# Patient Record
Sex: FEMALE | Race: OTHER | Employment: UNEMPLOYED | URBAN - METROPOLITAN AREA
[De-identification: names, ages, dates, MRNs, and addresses within clinical notes are randomized per-mention and may not be internally consistent; named-entity substitution may affect disease eponyms.]

---

## 2017-03-31 ENCOUNTER — OFFICE VISIT (OUTPATIENT)
Dept: FAMILY MEDICINE CLINIC | Age: 52
End: 2017-03-31

## 2017-03-31 VITALS
HEART RATE: 69 BPM | BODY MASS INDEX: 25.45 KG/M2 | DIASTOLIC BLOOD PRESSURE: 59 MMHG | SYSTOLIC BLOOD PRESSURE: 116 MMHG | WEIGHT: 148 LBS | TEMPERATURE: 98.3 F

## 2017-03-31 DIAGNOSIS — J98.9 REACTIVE AIRWAY DISEASE THAT IS NOT ASTHMA: ICD-10-CM

## 2017-03-31 DIAGNOSIS — R00.2 INTERMITTENT PALPITATIONS: ICD-10-CM

## 2017-03-31 DIAGNOSIS — D25.9 UTERINE LEIOMYOMA, UNSPECIFIED LOCATION: ICD-10-CM

## 2017-03-31 DIAGNOSIS — F51.01 PRIMARY INSOMNIA: ICD-10-CM

## 2017-03-31 DIAGNOSIS — F32.A MILD DEPRESSION: Primary | ICD-10-CM

## 2017-03-31 RX ORDER — ALBUTEROL SULFATE 90 UG/1
2 AEROSOL, METERED RESPIRATORY (INHALATION)
Qty: 1 INHALER | Refills: 1 | Status: SHIPPED | OUTPATIENT
Start: 2017-03-31 | End: 2018-03-26

## 2017-03-31 NOTE — PROGRESS NOTES
Coordination of Care  1. Have you been to the ER, urgent care clinic since your last visit? Hospitalized since your last visit? No    2. Have you seen or consulted any other health care providers outside of the [facility] since your last visit? Include any pap smears or colon screening. No    Medications  Medication Reconciliation Performed: no  Patient does not need refills     Learning Assessment Complete?  yes

## 2017-03-31 NOTE — MR AVS SNAPSHOT
Visit Information Date & Time Provider Department Dept. Phone Encounter #  
 3/31/2017 10:30 AM Ted Fox [facility] [phone] [id number] Follow-up Instructions Return in about 3 months (around 6/30/2017), or if symptoms worsen or fail to improve. Upcoming Health Maintenance Date Due Hepatitis C Screening 1965 DTaP/Tdap/Td series (1 - Tdap) 9/2/1985 FOBT Q 1 YEAR AGE 50-75 9/2/2014 INFLUENZA AGE 9 TO ADULT 8/1/2016 PAP AKA CERVICAL CYTOLOGY 5/6/2018 BREAST CANCER SCRN MAMMOGRAM 5/19/2018 Allergies as of 3/31/2017  Review Complete On: 3/31/2017 By: Josh Nuñez No Known Allergies Current Immunizations  Never Reviewed No immunizations on file. Not reviewed this visit You Were Diagnosed With   
  
 Codes Comments Mild depression    -  Primary ICD-10-CM: F32.0 ICD-9-CM: 314 Primary insomnia     ICD-10-CM: F51.01 
ICD-9-CM: 307.42 Intermittent palpitations     ICD-10-CM: R00.2 ICD-9-CM: 785.1 Uterine leiomyoma, unspecified location     ICD-10-CM: D25.9 ICD-9-CM: 218.9 Vitals BP Pulse Temp Weight(growth percentile) LMP BMI  
 116/59 (BP 1 Location: Left arm, BP Patient Position: Sitting) 69 98.3 °F (36.8 °C) (Oral) 148 lb (67.1 kg) 02/24/2017 (Exact Date) 25.45 kg/m2 OB Status Smoking Status Having regular periods Never Smoker Vitals History BMI and BSA Data Body Mass Index Body Surface Area  
 25.45 kg/m 2 1.74 m 2 [facility] Name Phone Yvon Fuentes [address], [address] [phone] Your Updated Medication List  
  
   
This list is accurate as of: 3/31/17 11:19 AM.  Always use your most recent med list.  
  
  
  
  
 loratadine 10 mg tablet Commonly known as:  Libby Benitez Take 1 Tab by mouth daily. traZODone 50 mg tablet Commonly known as:  Tariq Mcfadden  
 Take 1 Tab by mouth nightly. TYLENOL 325 mg tablet Generic drug:  acetaminophen Take 325 mg by mouth every four (4) hours as needed. We Performed the Following AMB POC EKG ROUTINE W/ 12 LEADS, INTER & REP [03205 CPT(R)] Follow-up Instructions Return in about 3 months (around 6/30/2017), or if symptoms worsen or fail to improve. Por favor proporcione marty resumen de la documentación de cuidado a barakat próximo proveedor. Your primary care clinician is listed as Pinky Heads. If you have any questions after today's visit, please call [phone].

## 2017-06-07 ENCOUNTER — OFFICE VISIT (OUTPATIENT)
Dept: FAMILY PLANNING/WOMEN'S HEALTH CLINIC | Age: 52
End: 2017-06-07

## 2017-06-07 ENCOUNTER — HOSPITAL ENCOUNTER (OUTPATIENT)
Dept: MAMMOGRAPHY | Age: 52
Discharge: HOME | End: 2017-06-07

## 2017-06-07 VITALS — SYSTOLIC BLOOD PRESSURE: 122 MMHG | DIASTOLIC BLOOD PRESSURE: 75 MMHG

## 2017-06-07 DIAGNOSIS — Z01.419 ENCOUNTER FOR WELL WOMAN EXAM: Primary | ICD-10-CM

## 2017-06-07 DIAGNOSIS — Z12.31 VISIT FOR SCREENING MAMMOGRAM: ICD-10-CM

## 2017-06-07 PROCEDURE — 77067 SCR MAMMO BI INCL CAD: CPT

## 2017-06-07 NOTE — PROGRESS NOTES
EVERY WOMANS LIFE HISTORY QUESTIONNAIRE       No Yes Comments   Has a doctor ever seen or felt anything wrong with your breast? [x]                                  []                                     Have you ever had a breast biopsy? [x]                                  []                                          When and where was last mammogram performed? 5/2016 with [facility]    Have you ever been told that there was a problem on your mammogram?   No Yes Comments   []                                  [x]                                  Birads 0 x 1, 2016 then benign result     Do you have breast implants? No Yes Comments   [x]                                  []                                       When was your last Pap test performed? 5/2015    Have you ever had an abnormal Pap test?   No Yes Comments   [x]                                  []                                       Have you had a hysterectomy? No Yes Comments (why)   [x]                                  []                                       Have you ever been diagnosed with any type of Cancer   No Yes Comments (type,when,where,type of treatment   [x]                                  []                                          Has a family member been diagnosed with breast or ovarian cancer? No Yes Comments (which family members, and type   [x]                                  []                                       Did your mother take SABA? No Yes Unknown   []                                  []                                  X     Do you have a history of HIV exposure? No Yes    [x]                                  []                                         Have you been through menopause?    No Yes Date of LMP   [x]                                  []                                  Presently on period     Are you taking hormone replacement therapy (HRT)     No Yes Comments   [x]                                  [] How many times have you been pregnant? 2       Number of live births ? 2    Are you experiencing any of the following? No Yes Comments   Nipple Discharge [x]                                  []                                     Breast Lump/Masses [x]                                  []                                     Breast Skin Changes [x]                                  []                                          No Yes Comments   Vaginal Discharge [x]                                  []                                     Abnormal/unusual vaginal bleeding [x]                                  []                                         Are you experiencing any other health problems?     Seasonal allergies, has fibroids----followed at [facility]    On her period today, will defer gyn exam.  Not due for Pap Test this year

## 2017-06-07 NOTE — PROGRESS NOTES
Assessment/Plan:    Well woman exam  On menses so not able to do heme stool card- advised to f/up at [facility] for this test    Follow-up Disposition: Not on File    [address] LEENA Jurado expressed understanding of this plan. An AVS was printed and given to the patient.      ----------------------------------------------------------------------    Chief Complaint   Patient presents with    Well Woman     [facility] visit       History of Present Illness:   2 c-sections  Normal monthly periods in the past year. Last 5 days, not heavy bleeding. No hot flashes but some mood changes  No discomfort with sexual intercourse  Last mammo  normal  Last pap  normal  Hx of fibroid(s), no imaging in her chart but was on depo for the bleeding, currently not on hormone tx        No past medical history on file. Current Outpatient Prescriptions   Medication Sig Dispense Refill    loratadine (CLARITIN) 10 mg tablet Take 1 Tab by mouth daily. 30 Tab 6    traZODone (DESYREL) 50 mg tablet Take 1 Tab by mouth nightly. 30 Tab 2    albuterol (PROVENTIL HFA, VENTOLIN HFA, PROAIR HFA) 90 mcg/actuation inhaler Take 2 Puffs by inhalation every four (4) hours as needed for Wheezing or Shortness of Breath for up to 360 days. 1 Inhaler 1    acetaminophen (TYLENOL) 325 mg tablet Take 325 mg by mouth every four (4) hours as needed. No Known Allergies    Social History   Substance Use Topics    Smoking status: Never Smoker    Smokeless tobacco: None    Alcohol use No       No family history on file. Physical Exam:     Visit Vitals    /75       A&Ox3  WDWN NAD  Respirations normal and non labored  Breast exam- bri w/out masses, dimpling, skin retraction, nipple changes  abd- soft, flat  Pelvic exam bimanual only- on menstrual cycle.  Uterus is slightly out of pelvis, not tender to exam.

## 2017-10-16 ENCOUNTER — HOSPITAL ENCOUNTER (OUTPATIENT)
Dept: LAB | Age: 52
Discharge: HOME | End: 2017-10-16

## 2017-10-16 ENCOUNTER — OFFICE VISIT (OUTPATIENT)
Dept: FAMILY MEDICINE CLINIC | Age: 52
End: 2017-10-16

## 2017-10-16 VITALS
TEMPERATURE: 98.5 F | BODY MASS INDEX: 25.62 KG/M2 | SYSTOLIC BLOOD PRESSURE: 109 MMHG | HEART RATE: 68 BPM | WEIGHT: 153.8 LBS | HEIGHT: 65 IN | DIASTOLIC BLOOD PRESSURE: 67 MMHG

## 2017-10-16 DIAGNOSIS — Z12.11 ENCOUNTER FOR SCREENING FECAL OCCULT BLOOD TESTING: Primary | ICD-10-CM

## 2017-10-16 DIAGNOSIS — N93.8 DUB (DYSFUNCTIONAL UTERINE BLEEDING): ICD-10-CM

## 2017-10-16 LAB — HGB BLD-MCNC: 10.8 G/DL

## 2017-10-16 PROCEDURE — 88175 CYTOPATH C/V AUTO FLUID REDO: CPT | Performed by: PHYSICIAN ASSISTANT

## 2017-10-16 RX ORDER — LANOLIN ALCOHOL/MO/W.PET/CERES
325 CREAM (GRAM) TOPICAL
Qty: 90 TAB | Refills: 1 | Status: SHIPPED | OUTPATIENT
Start: 2017-10-16 | End: 2022-03-21

## 2017-10-16 NOTE — MR AVS SNAPSHOT
Visit Information Date & Time Provider Department Dept. Phone Encounter #  
 10/16/2017 10:30 AM Roberto Chacko [address], [address] [phone] [id number] Follow-up Instructions Return in about 6 weeks (around 11/27/2017). Upcoming Health Maintenance Date Due Hepatitis C Screening 1965 DTaP/Tdap/Td series (1 - Tdap) 9/2/1985 FOBT Q 1 YEAR AGE 50-75 9/2/2014 INFLUENZA AGE 9 TO ADULT 8/1/2017 PAP AKA CERVICAL CYTOLOGY 5/6/2018 BREAST CANCER SCRN MAMMOGRAM 6/7/2019 Allergies as of 10/16/2017  Review Complete On: 10/16/2017 By: Jared Oconnor No Known Allergies Current Immunizations  Never Reviewed No immunizations on file. Not reviewed this visit You Were Diagnosed With   
  
 Codes Comments Encounter for screening fecal occult blood testing    -  Primary ICD-10-CM: Z12.11 ICD-9-CM: V76.51   
 DUB (dysfunctional uterine bleeding)     ICD-10-CM: N93.8 ICD-9-CM: 626.8 Vitals BP Pulse Temp Height(growth percentile) Weight(growth percentile) 109/67 (BP 1 Location: Left arm, BP Patient Position: Sitting) 68 98.5 °F (36.9 °C) (Oral) 5' 4.76\" (1.645 m) 153 lb 12.8 oz (69.8 kg) LMP BMI OB Status Smoking Status 09/27/2017 (Exact Date) 25.78 kg/m2 Having regular periods Never Smoker BMI and BSA Data Body Mass Index Body Surface Area 25.78 kg/m 2 1.79 m 2 [facility] Name Phone Tangela Renee [address] [facility], [address] [facility] [phone] Your Updated Medication List  
  
   
This list is accurate as of: 10/16/17 10:57 AM.  Always use your most recent med list.  
  
  
  
  
 albuterol 90 mcg/actuation inhaler Commonly known as:  PROVENTIL HFA, VENTOLIN HFA, PROAIR HFA Take 2 Puffs by inhalation every four (4) hours as needed for Wheezing or Shortness of Breath for up to 360 days. ferrous sulfate 325 mg (65 mg iron) tablet Commonly known as:  Iron Take 1 Tab by mouth Daily (before breakfast). Take with orange juice  
  
 loratadine 10 mg tablet Commonly known as:  Roberto Santamariaff Take 1 Tab by mouth daily. traZODone 50 mg tablet Commonly known as:  [facility]worth Take 1 Tab by mouth nightly. TYLENOL 325 mg tablet Generic drug:  acetaminophen Take 325 mg by mouth every four (4) hours as needed. Recetas Enviado a la Raghav Refills  
 ferrous sulfate (IRON) 325 mg (65 mg iron) tablet 1 Sig: Take 1 Tab by mouth Daily (before breakfast). Take with orange juice Class: Normal  
 Pharmacy: Chidi Pappas [address], [address] [facility] #: [phone] Route: Oral  
  
We Performed the Following AMB POC FECAL BLOOD, OCCULT, QL 1 CARD [77322 CPT(R)] AMB POC HEMOGLOBIN (HGB) [90825 CPT(R)] Follow-up Instructions Return in about 6 weeks (around 11/27/2017). Patient Instructions Detección del cáncer de colon: Instrucciones de cuidado - [ Colon Cancer Screening: Care Instructions ] Instrucciones de cuidado El cáncer colorrectal se produce en el colon o en el recto. Esta es la parte final del aparato digestivo. Es la segunda causa principal de muertes por cáncer en los [place]. Suele comenzar con pequeñas formaciones llamadas pólipos en el colon o en el recto. Generalmente, los pólipos se encuentran con pruebas de detección. Dependiendo del tipo de prueba, se pueden extirpar los pólipos encontrados samson la prueba. Al principio, el cáncer colorrectal no suele producir síntomas. Sylwia las pruebas regulares pueden ayudar a detectarlo temprano, antes de que se extienda y sea más difícil de tratar. Los expertos [employer] personas empiecen a hacerse pruebas de rutina para detectar el cáncer de colon a partir de los 48 Los deb.  Y aconsejan que las personas con un riesgo más alto de tener cáncer de colon se ria las pruebas antes. Hable con barakat médico sobre cuándo debe empezar a [employer]. Hable acerca de qué pruebas necesita. La atención de seguimiento es bunny parte clave de barakat tratamiento y seguridad. Asegúrese de hacer y acudir a todas las citas, y llame a barakat médico si está teniendo problemas. También es bunny buena idea saber los resultados de angelina exámenes y mantener bunny lista de los medicamentos que ubaldo. Cuáles son [place] detección para el cáncer de colon? · Pruebas fecales. Estas pruebas incluyen la prueba inmunoquímica fecal (FIT, por angelina siglas en inglés) y la prueba de elise oculta en heces (FOBT, por angelina siglas en inglés). Estas pruebas [employer]  de heces para detectar señales de cáncer. Si el resultado de la prueba es positivo, tendrá que hacerse bunny colonoscopia. · Sigmoidoscopia. Esta prueba permite que el médico explore el revestimiento del recto y la parte más baja del colon. El médico usará un tubo iluminado llamado sigmoidoscopio. Esta prueba no puede detectar el cáncer ni los pólipos en la parte superior del colon. En algunos casos, pueden extirparse los pólipos que se encuentran. Sylwia si barakat médico detecta pólipos, usted tendrá que [employer] colonoscopia para examinar la parte superior del colon. · Colonoscopia. Esta prueba le permite al médico observar el revestimiento del recto y todo el colon. El médico utiliza un instrumento flexible y leach que se llama colonoscopio. También puede utilizarse para extirpar pólipos u obtener bunny muestra de tejido (biopsia). Qué pruebas necesita hacerse?  
Las siguientes pautas son para personas de 48 años o más que no corren un alto riesgo de tener cáncer colorrectal. Puede hacerse al menos bunny de estas pruebas según se lo indique barakat médico. 
· Bunny prueba inmunoquímica fecal (FIT, por angelina siglas en inglés) o Marlan Liner prueba de elise oculta en heces (FOBT, por micheline siglas en inglés) bunny vez al [employer] · Bunny sigmoidoscopia cada 5 años · Bunny colonoscopia cada 10 años Si tiene de 68 a 80 años, puede colaborar con craig médico para decidir si hacerse bunny prueba de detección es bunny buena opción. Si tiene [address] años o más, craig médico [place] byrnea que no es útil hacerse bunny prueba de detección. Hable con craig médico sobre cuándo [employer]. Y hable acerca de qué pruebas son adecuadas para usted. Craig médico puede recomendarle que se ellen pruebas antes o con más frecuencia si usted: 
· Ha tenido cáncer colorrectal con anterioridad. · Ha tenido pólipos en el colon. · Tiene síntomas de cáncer colorrectal. Estos incluyen elise en las heces y cambios en micheline hábitos de evacuación. · Tiene un padre o Deloria Slipper, un elier o Ebenweiler, o un hijo o bunny hija que tenga pólipos en el colon o cáncer colorrectal. 
· Tiene bunny enfermedad intestinal. [place] incluye colitis ulcerosa y enfermedad de Crohn. · Tiene un síndrome de pólipos, hereditario y poco común, cole la poliposis adenomatosa familiar (FAP, por micheline siglas en [facility]). · Se ha sometido a radioterapia en el abdomen o la pelvis. Cuándo debe pedir ayuda? Llame a craig médico ahora mismo o busque atención médica inmediata si: 
· Micheline heces son negruzcas y parecidas al alquitrán o tienen rastros de [place]. · Tiene dolor o sensibilidad en la parte baja del abdomen. Preste especial atención a los cambios en craig patt y asegúrese de comunicarse con craig médico si: · Tiene diarrea, estreñimiento u otro cambio en los hábitos intestinales que no mejora. · Micheline heces son más delgadas de lo normal. 
· Pierde peso y no sabe por qué. · Tiene cualquier pregunta acerca de las pruebas o el programa de detección. Dónde puede encontrar más información en inglés? Alison Imam a [email or web address]-yo.[email or web address]  
Veena Matthews [id number] en la búsqueda para aprender más acerca de \"Detección del cáncer de colon: Instrucciones de cuidado - [ Colon Cancer Screening: Care Instructions ]. \" 
Revisado: 3 lau, 2017 Versión del contenido: 11.3 © 2210-4989 Healthwise, Incorporated. Las instrucciones de cuidado fueron adaptadas bajo licencia por [employer] (which disclaims liability or warranty for this information). Si usted tiene [place] afección médica o sobre estas instrucciones, siempre pregunte a barakat profesional de patt. Healthwise, Incorporated niega toda garantía o responsabilidad por barakat uso de esta información. Por favor proporcione marty resumen de la documentación de cuidado a barakat próximo proveedor. Your primary care clinician is listed as Suzie Miller. If you have any questions after today's visit, please call [phone].

## 2017-10-16 NOTE — PROGRESS NOTES
Coordination of Care  1. Have you been to the ER, urgent care clinic since your last visit? Hospitalized since your last visit? No    2. Have you seen or consulted any other health care providers outside of the [address] [place] [address] since your last visit? Include any pap smears or colon screening. No    Medications  Does the patient need refills?  NO    Learning Assessment Complete? yes  Results for orders placed or performed in visit on 10/16/17   AMB POC HEMOGLOBIN (HGB)   Result Value Ref Range    Hemoglobin (POC) 10.8

## 2017-10-16 NOTE — PATIENT INSTRUCTIONS
Detección del cáncer de colon: Instrucciones de cuidado - [ Colon Cancer Screening: Care Instructions ]  Instrucciones de cuidado  El cáncer colorrectal se produce en el colon o en el recto. Esta es la parte final del aparato digestivo. Es la segunda causa principal de muertes por cáncer en los [place]. Suele comenzar con pequeñas formaciones llamadas pólipos en el colon o en el recto. Generalmente, los pólipos se encuentran con pruebas de detección. Dependiendo del tipo de prueba, se pueden extirpar los pólipos encontrados samson la prueba. Al principio, el cáncer colorrectal no suele producir síntomas. Sylwia las pruebas regulares pueden ayudar a detectarlo temprano, antes de que se extienda y sea más difícil de tratar. Los expertos [employer] personas empiecen a hacerse pruebas de rutina para detectar el cáncer de colon a partir de los 48 Los deb. Y aconsejan que las personas con un riesgo más alto de tener cáncer de colon se ria las pruebas antes. Hable con barakat médico sobre cuándo debe empezar a [employer]. Hable acerca de qué pruebas necesita. La atención de seguimiento es bunny parte clave de barakat tratamiento y seguridad. Asegúrese de hacer y acudir a todas las citas, y llame a barakat médico si está teniendo problemas. También es bunny buena idea saber los resultados de angelina exámenes y mantener bunny lista de los medicamentos que ubaldo. ¿Cuáles son [place] detección para el cáncer de colon? · Pruebas fecales. Estas pruebas incluyen la prueba inmunoquímica fecal (FIT, por angelina siglas en inglés) y la prueba de elise oculta en heces (FOBT, por angelina siglas en inglés). Estas pruebas [employer]  de heces para detectar señales de cáncer. Si el resultado de la prueba es positivo, tendrá que hacerse bunny colonoscopia. · Sigmoidoscopia. Esta prueba permite que el médico explore el revestimiento del recto y la parte más baja del colon.  El médico usará un tubo iluminado llamado sigmoidoscopio. Esta prueba no puede detectar el cáncer ni los pólipos en la parte superior del colon. En algunos casos, pueden extirparse los pólipos que se encuentran. Sylwia si barakat médico detecta pólipos, usted tendrá que [employer] colonoscopia para examinar la parte superior del colon. · Colonoscopia. Esta prueba le permite al médico observar el revestimiento del recto y todo el colon. El médico utiliza un instrumento flexible y leach que se llama colonoscopio. También puede utilizarse para extirpar pólipos u obtener bunny muestra de tejido (biopsia). ¿Qué pruebas necesita hacerse? Las siguientes pautas son para personas de 48 años o más que no corren un alto riesgo de tener cáncer colorrectal. Puede hacerse al menos bunny de estas pruebas según se lo indique barakat médico.  · Bunny prueba inmunoquímica fecal (FIT, por angelina siglas en inglés) o bunny prueba de elise oculta en heces (FOBT, por angelina siglas en inglés) bunny vez al Richardette Pinch  · [place] sigmoidoscopia cada 5 años  · [place] colonoscopia cada 10 años  Si tiene de 68 a 80 años, puede colaborar con barakat médico para decidir si hacerse bunny prueba de detección es bunny buena opción. Si tiene 80 años o más, barakat médico [employer] diga que no es útil hacerse bunny prueba de detección. Hable con barakat médico sobre cuándo [employer]. Y hable acerca de qué pruebas son adecuadas para usted. Barakat médico puede recomendarle que se ellen pruebas antes o con más frecuencia si usted:  · Ha tenido cáncer colorrectal con anterioridad. · Ha tenido pólipos en el colon. · Tiene síntomas de cáncer colorrectal. Estos incluyen elise en las heces y cambios en angelina hábitos de evacuación. · Tiene un padre o Charli Cantu, un elier o Ebenweiler, o un hijo o bunny hija que tenga pólipos en el colon o cáncer colorrectal.  · Tiene bunny enfermedad intestinal. [place] incluye colitis ulcerosa y enfermedad de Crohn.   · Tiene un síndrome de pólipos, hereditario y [address] común, cole la poliposis adenomatosa familiar (FAP, por micheline siglas en inglés). · Se ha sometido a radioterapia en el abdomen o la pelvis. ¿Cuándo debe pedir ayuda? Llame a barakat médico ahora mismo o busque atención médica inmediata si:  · Micheline heces son negruzcas y parecidas al alquitrán o tienen rastros de [place]. · Tiene dolor o sensibilidad en la parte baja del abdomen. Preste especial atención a los cambios en barakat patt y asegúrese de comunicarse con barakat médico si:  · Tiene diarrea, estreñimiento u otro cambio en los hábitos intestinales que no mejora. · Micheline heces son más delgadas de lo normal.  · Pierde peso y no sabe por qué. · Tiene cualquier pregunta acerca de las pruebas o el programa de detección. ¿Dónde puede encontrar más información en [facility]? Drea John a [email or web address]-yo.[email or web address] Ethel Hinojosa M541 en la búsqueda para aprender más acerca de \"Detección del cáncer de colon: Instrucciones de cuidado - [ Colon Cancer Screening: Care Instructions ]. \"  Revisado: 3 lau, 2017  Versión del contenido: 11.3  © 1276-4255 Healthwise, Incorporated. Las instrucciones de cuidado fueron adaptadas bajo licencia por [employer] (which disclaims liability or warranty for this information). Si usted tiene [place] afección médica o sobre estas instrucciones, siempre pregunte a barakat profesional de patt. Healthwise, Incorporated niega toda garantía o responsabilidad por barakat uso de esta información.

## 2017-10-16 NOTE — PROGRESS NOTES
Assessment/Plan:    Diagnoses and all orders for this visit:    1. Encounter for screening fecal occult blood testing  -     AMB POC FECAL BLOOD, OCCULT, QL 1 CARD    Offered to put pt back on OCP but she would like to stay off hormones  Start iron every day with OJ  Recheck in 6 weeks    Follow-up Disposition:  Return in about 1 year (around 10/16/2018). Olean Glow McFerren, PA-C Simon Moritz expressed understanding of this plan. An AVS was printed and given to the patient.      ----------------------------------------------------------------------    Chief Complaint   Patient presents with    Well Woman     Heavy irregula menses       History of Present Illness:  Pt here for continuation of her well woman exam from a few months ago. At that time, it was not possible to check a heme guaiac card due to her menstrual bleeding. She is not having any current menstrual bleeding. She has a hx of fibroids and often has irregular periods, she had been on hormone therapy to control her bleeding but chose to stop this. She is not bothered by her prolonged menstrual periods because she states that the periods are generally light- only spotting. She has not had a colonoscopy before. She has not family hx of colon cancer. She has not had a change in her bowel movements. She has never seen blood in her stool     Hemoglobin today is 10.8. This past month she had spotting for 17 days      No past medical history on file. Current Outpatient Prescriptions   Medication Sig Dispense Refill    albuterol (PROVENTIL HFA, VENTOLIN HFA, PROAIR HFA) 90 mcg/actuation inhaler Take 2 Puffs by inhalation every four (4) hours as needed for Wheezing or Shortness of Breath for up to 360 days. 1 Inhaler 1    loratadine (CLARITIN) 10 mg tablet Take 1 Tab by mouth daily. 30 Tab 6    traZODone (DESYREL) 50 mg tablet Take 1 Tab by mouth nightly.  30 Tab 2    acetaminophen (TYLENOL) 325 mg tablet Take 325 mg by mouth every four (4) hours as needed. No Known Allergies    Social History   Substance Use Topics    Smoking status: Never Smoker    Smokeless tobacco: Never Used    Alcohol use No       No family history on file.     Physical Exam:     Visit Vitals    /67 (BP 1 Location: Left arm, BP Patient Position: Sitting)    Pulse 68    Temp 98.5 °F (36.9 °C) (Oral)    Ht 5' 4.76\" (1.645 m)    Wt 153 lb 12.8 oz (69.8 kg)    LMP 09/27/2017 (Exact Date)    BMI 25.78 kg/m2       A&Ox3  WDWN NAD  Respirations normal and non labored  Rectal exam- no masses felt in vault, no external masses, scant feces present, heme neg guaiac today  Hemoglobin 10.8

## 2017-12-15 ENCOUNTER — OFFICE VISIT (OUTPATIENT)
Dept: FAMILY MEDICINE CLINIC | Age: 52
End: 2017-12-15

## 2017-12-15 VITALS
BODY MASS INDEX: 26.48 KG/M2 | DIASTOLIC BLOOD PRESSURE: 48 MMHG | HEART RATE: 64 BPM | SYSTOLIC BLOOD PRESSURE: 118 MMHG | TEMPERATURE: 98.3 F | WEIGHT: 158 LBS

## 2017-12-15 DIAGNOSIS — D50.0 IRON DEFICIENCY ANEMIA DUE TO CHRONIC BLOOD LOSS: ICD-10-CM

## 2017-12-15 DIAGNOSIS — F32.5 MAJOR DEPRESSIVE DISORDER WITH SINGLE EPISODE, IN REMISSION (HCC): ICD-10-CM

## 2017-12-15 DIAGNOSIS — I83.893 VARICOSE VEINS OF BOTH LEGS WITH EDEMA: ICD-10-CM

## 2017-12-15 DIAGNOSIS — N92.0 MENORRHAGIA WITH REGULAR CYCLE: Primary | ICD-10-CM

## 2017-12-15 LAB — HGB BLD-MCNC: 12.6 G/DL

## 2017-12-15 RX ORDER — DICLOFENAC SODIUM 10 MG/G
GEL TOPICAL 4 TIMES DAILY
Qty: 100 G | Refills: 0 | Status: SHIPPED | OUTPATIENT
Start: 2017-12-15

## 2017-12-15 NOTE — PROGRESS NOTES
Results for orders placed or performed in visit on 12/15/17   AMB POC HEMOGLOBIN (HGB)   Result Value Ref Range    Hemoglobin (POC) 12.6      Coordination of Care  1. Have you been to the ER, urgent care clinic since your last visit? Hospitalized since your last visit? No    2. Have you seen or consulted any other health care providers outside of the [address] [place] [address] since your last visit? Include any pap smears or colon screening. No    Medications  Does the patient need refills? NO    Learning Assessment Complete?  yes

## 2017-12-15 NOTE — MR AVS SNAPSHOT
Visit Information Date & Time Provider Department Dept. Phone Encounter #  
 12/15/2017 10:30 AM Kelley Lacey, [address] [facility] [address] [phone] [id number] Follow-up Instructions Return in about 6 months (around 6/15/2018), or if symptoms worsen or fail to improve. Upcoming Health Maintenance Date Due Hepatitis C Screening 1965 DTaP/Tdap/Td series (1 - Tdap) 9/2/1985 FOBT Q 1 YEAR AGE 50-75 9/2/2014 Influenza Age 5 to Adult 8/1/2017 PAP AKA CERVICAL CYTOLOGY 10/16/2020 Allergies as of 12/15/2017  Review Complete On: 12/15/2017 By: Kelley Lacey MD  
 No Known Allergies Current Immunizations  Never Reviewed No immunizations on file. Not reviewed this visit You Were Diagnosed With   
  
 Codes Comments Menorrhagia with regular cycle    -  Primary ICD-10-CM: N92.0 ICD-9-CM: 626.2 Iron deficiency anemia due to chronic blood loss     ICD-10-CM: D50.0 ICD-9-CM: 280.0 Major depressive disorder with single episode, in remission [facility])     ICD-10-CM: F32.5 ICD-9-CM: 296.25 Screening for iron deficiency anemia     ICD-10-CM: Z13.0 ICD-9-CM: V78.0 Varicose veins of both legs with edema     ICD-10-CM: L08.419 ICD-9-CM: 454.8 Vitals BP Pulse Temp Weight(growth percentile) LMP BMI  
 118/48 (BP 1 Location: Left arm, BP Patient Position: Sitting) 64 98.3 °F (36.8 °C) (Oral) 158 lb (71.7 kg) 11/24/2017 26.48 kg/m2 OB Status Smoking Status Having regular periods Never Smoker Vitals History BMI and BSA Data Body Mass Index Body Surface Area  
 26.48 kg/m 2 1.81 m 2 [facility] Name Phone Rafael Duty [address], [address] [phone] Your Updated Medication List  
  
   
This list is accurate as of: 12/15/17 11:09 AM.  Always use your most recent med list.  
  
  
  
  
 albuterol 90 mcg/actuation inhaler Commonly known as:  PROVENTIL HFA, VENTOLIN HFA, PROAIR HFA Take 2 Puffs by inhalation every four (4) hours as needed for Wheezing or Shortness of Breath for up to 360 days. diclofenac 1 % Gel Commonly known as:  VOLTAREN Apply  to affected area four (4) times daily. ferrous sulfate 325 mg (65 mg iron) tablet Commonly known as:  Iron Take 1 Tab by mouth Daily (before breakfast). Take with orange juice  
  
 loratadine 10 mg tablet Commonly known as:  Niki Dustman Take 1 Tab by mouth daily. traZODone 50 mg tablet Commonly known as:  Aurelio Belfast Take 1 Tab by mouth nightly. TYLENOL 325 mg tablet Generic drug:  acetaminophen Take 325 mg by mouth every four (4) hours as needed. Impresion de recetas Refills  
 diclofenac (VOLTAREN) 1 % gel 0 Sig: Apply  to affected area four (4) times daily. Class: Print Route: Topical  
  
We Performed the Following AMB POC HEMOGLOBIN (HGB) [93789 CPT(R)] Follow-up Instructions Return in about 6 months (around 6/15/2018), or if symptoms worsen or fail to improve. Por favor proporcione marty resumen de la documentación de cuidado a barakat próximo proveedor. Your primary care clinician is listed as Bev Duffy. If you have any questions after today's visit, please call [phone].

## 2017-12-15 NOTE — PROGRESS NOTES
Subjective:     Chief Complaint   Patient presents with    Anemia     follow up     Abdominal Pain      She  is a 47 y.o. female who presents for evaluation of:   Depression - Doing excellent today. Using Trazodone PRN for depression and to help with sleep. Depressed about relationship with . Had decr appetite and lost 30 lbs over about 3-4 months. Sx started after losing father and then mother in past few years. Sleep improved. Being tx for Fe def anemia with Fe and doing well. Occ has constipation type pain but noticed this resolves when she does not take her Fe. Drinking plenty of water, exercise, and fiber. ROS  Gen - no fever/chills  Resp - no dyspnea or cough  CV - no chest pain or ISSA  Rest per HPI     Objective:     Vitals:    12/15/17 1030   BP: 118/48   Pulse: 64   Temp: 98.3 °F (36.8 °C)   TempSrc: Oral   Weight: 158 lb (71.7 kg)     Physical Examination:  General appearance - alert, well appearing, and in no distress  Eyes - sclera anicteric  Chest - clear to auscultation, no wheezes, rales or rhonchi, symmetric air entry  Heart - normal rate, regular rhythm, normal S1, S2, no murmurs, rubs, clicks or gallops  Extr - no edema, + varicose veins noted and mildly ttp heraclio in RLE  Psych - nml mood and affect    No past medical history on file. Past Surgical History:   Procedure Laterality Date    HX GYN           Current Outpatient Prescriptions on File Prior to Visit   Medication Sig Dispense Refill    ferrous sulfate (IRON) 325 mg (65 mg iron) tablet Take 1 Tab by mouth Daily (before breakfast). Take with orange juice 90 Tab 1    albuterol (PROVENTIL HFA, VENTOLIN HFA, PROAIR HFA) 90 mcg/actuation inhaler Take 2 Puffs by inhalation every four (4) hours as needed for Wheezing or Shortness of Breath for up to 360 days. 1 Inhaler 1    loratadine (CLARITIN) 10 mg tablet Take 1 Tab by mouth daily. 30 Tab 6    traZODone (DESYREL) 50 mg tablet Take 1 Tab by mouth nightly.  27 Tab 2    acetaminophen (TYLENOL) 325 mg tablet Take 325 mg by mouth every four (4) hours as needed. No current facility-administered medications on file prior to visit. Assessment/ Plan:   Diagnoses and all orders for this visit:    1. Menorrhagia with regular cycle - hx of fibroids likely culprit  2. Iron deficiency anemia due to chronic blood loss - Hgb nml now and no concerns with palpitations of dizziness any longer. Was having some abd pain/constipation issues with Fe and advised of nml Hgb so discussed stopping Fe for 5-7 days if SE flare up and then restarting to maintain nml Hgb. 3. Major depressive disorder with single episode, in remission ([facility] [address]  - Improving with Trazdone PRN at low dose. 4. Varicose veins of both legs with edema - will try Voltaren gel for pain. Would consider LE doppler at some point if pain and swelling are getting worse.  -     diclofenac (VOLTAREN) 1 % gel; Apply  to affected area four (4) times daily. I have discussed the diagnosis with the patient and the intended plan as seen in the above orders. The patient has received an after-visit summary and questions were answered concerning future plans. I have discussed medication side effects and warnings with the patient as well. The patient verbalizes understanding and agreement with the plan. Follow-up Disposition:  Return in about 6 months (around 6/15/2018), or if symptoms worsen or fail to improve.

## 2018-05-15 ENCOUNTER — HOSPITAL ENCOUNTER (OUTPATIENT)
Dept: LAB | Age: 53
Discharge: HOME | End: 2018-05-15

## 2018-05-15 ENCOUNTER — OFFICE VISIT (OUTPATIENT)
Dept: FAMILY MEDICINE CLINIC | Age: 53
End: 2018-05-15

## 2018-05-15 VITALS
WEIGHT: 160 LBS | SYSTOLIC BLOOD PRESSURE: 112 MMHG | TEMPERATURE: 98.2 F | HEART RATE: 65 BPM | DIASTOLIC BLOOD PRESSURE: 66 MMHG | BODY MASS INDEX: 26.82 KG/M2

## 2018-05-15 DIAGNOSIS — Z01.419 WELL WOMAN EXAM: Primary | ICD-10-CM

## 2018-05-15 PROCEDURE — 88142 CYTOPATH C/V THIN LAYER: CPT | Performed by: PHYSICIAN ASSISTANT

## 2018-05-15 NOTE — Clinical Note
Ema Rogers, I accidentally sent this referral to Tyra Shaw.  This pt speaks [place] and has agreed to meet for [facility] [place], [facility].. thanks

## 2018-05-15 NOTE — MR AVS SNAPSHOT
[address] [place] [address] [phone] Patient: Kiana Zimmer MRN: [id number] Visit Information Date & Time Provider Department Dept. Phone Encounter #  
 5/15/2018 10:10 AM Sally Chacko [facility], [facility] [phone] [id number] Follow-up Instructions Return in about 1 year (around 5/15/2019). Routing History Upcoming Health Maintenance Date Due Hepatitis C Screening 1965 DTaP/Tdap/Td series (1 - Tdap) 9/2/1985 FOBT Q 1 YEAR AGE 50-75 9/2/2014 Influenza Age 5 to Adult 8/1/2018 BREAST CANCER SCRN MAMMOGRAM 6/7/2019 PAP AKA CERVICAL CYTOLOGY 10/16/2020 Allergies as of 5/15/2018  Review Complete On: 5/15/2018 By: Rafat Crews No Known Allergies Current Immunizations  Never Reviewed No immunizations on file. Not reviewed this visit You Were Diagnosed With   
  
 Codes Comments Well woman exam    -  Primary ICD-10-CM: A32.505 ICD-9-CM: V72.31 Vitals BP Pulse Temp Weight(growth percentile) LMP BMI  
 112/66 (BP 1 Location: Left arm, BP Patient Position: Sitting) 65 98.2 °F (36.8 °C) (Oral) 160 lb (72.6 kg) 04/29/2018 26.82 kg/m2 OB Status Smoking Status Having regular periods Never Smoker Vitals History BMI and BSA Data Body Mass Index Body Surface Area  
 26.82 kg/m 2 1.82 m 2 [facility] Name Phone Shiela Garcia [address], [address] [phone] Your Updated Medication List  
  
   
This list is accurate as of 5/15/18 10:24 AM.  Always use your most recent med list.  
  
  
  
  
 diclofenac 1 % Gel Commonly known as:  VOLTAREN Apply  to affected area four (4) times daily. ferrous sulfate 325 mg (65 mg iron) tablet Commonly known as:  Iron Take 1 Tab by mouth Daily (before breakfast). Take with orange juice loratadine 10 mg tablet Commonly known as:  Ricci Pour Take 1 Tab by mouth daily. traZODone 50 mg tablet Commonly known as:  Valarie Kiarra Take 1 Tab by mouth nightly. TYLENOL 325 mg tablet Generic drug:  acetaminophen Take 325 mg by mouth every four (4) hours as needed. We Performed the Following PAP, LB, RFX HPV UVYNL([id number]) [id number] CPT(R)] Follow-up Instructions Return in about 1 year (around 5/15/2019). Patient Instructions Visita de control para mujeres de 50 a 65 años: Instrucciones de cuidado - [ Well Visit, Women 48 to 72: Care Instructions ] Instrucciones de cuidado Los exámenes físicos pueden ayudarla a mantenerse saludable. Barakat médico buchanan revisado barakat estado general de patt y podría haberle dado algunos consejos para cuidarse. También podría haberle recomendado otros exámenes. En barakat hogar, usted puede ayudar a prevenir enfermedades si come de manera saludable, hace ejercicio con regularidad y sigue otras recomendaciones. La atención de seguimiento es bunny parte clave de barakat tratamiento y seguridad. Asegúrese de hacer y acudir a todas las citas, y llame a barakat médico si está teniendo problemas. También es bunny buena idea saber los resultados de los exámenes y mantener bunny lista de los medicamentos que ubaldo. Cómo puede cuidarse en el hogar? · Alcance un peso saludable y [place]. [place] disminuirá el riesgo de tener [employer], tales cole obesidad, diabetes, enfermedad cardíaca y presión arterial caitlyn. · Rebekah al menos 30 minutos de ejercicio la mayoría de los días de la [place]. Caminar es bunny buena opción. Es posible que prefiera hacer otras actividades, cole correr, nadar, [employer], o jugar al tenis u otros deportes de equipo. · No fume. Fumar puede [employer] problemas de la patt.  Si necesita ayuda para dejar de fumar, hable con barakat médico sobre programas y medicamentos para dejar de fumar. Pueden aumentar angelina probabilidades de dejar el hábito para siempre. · Protéjase la piel del exceso de sol. [address] 10 a.m. y las 4 p.m., permanezca a la raul o Coffey Ehrich con prendas de vestir y un sombrero de ala ancha. Use gafas de sol que bloqueen los nishi ultravioleta. Póngase un protector solar de amplio espectro (SPF 30 o superior) en la piel expuesta, incluso cuando esté nublado. · Acuda al dentista [facility] veces al año para hacerse chequeos y limpiezas dentales. · En el automóvil, use el cinturón de seguridad. · Limite el alcohol a 1 bebida al día. Beber alcohol en exceso puede causarle problemas de patt. Siga las recomendaciones de barakat médico acerca de cuándo hacerse determinadas pruebas. Estas pruebas pueden detectar problemas temprano. · Colesterol. Barakat médico le avisará con qué frecuencia debe hacerse esta prueba según abrakat edad, angelina antecedentes familiares y otros factores que puedan incrementar el riesgo de ataque al corazón y ataque cerebral. 
· Presión arterial. Hágase nathalie la presión arterial samson bunny visita de rutina al médico. Barakat médico le dirá con qué frecuencia debe revisarse la presión arterial según barakat edad, los [place] de barakat presión arterial y otros factores. · Mamografía. Pregúntele a barakat médico con qué frecuencia debe hacerse [employer]	[place], la cual es bunny radiografía (nishi X) de los senos (mamas). Bunny mamografía puede detectar el cáncer de seno antes de que pueda palparse y en la etapa en que es más fácil de tratar. · Prueba de Papanicolaou y examen pélvico. Pregúntele a barakat médico con qué frecuencia debe hacerse bunny prueba de Papanicolaou. Es posible que no tenga que hacerse bunny prueba de Papanicolaou con la misma frecuencia de antes. · Visión. Hágase un chequeo de la visión cada troy o [address] [place], o con la frecuencia que barakat médico sugiera.  Algunos expertos recomiendan que se ellen exámenes anuales para detectar glaucoma u otros problemas de la visión relacionados con la edad después de los 48 Los deb. · Audición. Avísele a craig médico si nota algún cambio en craig audición. Usted puede hacerse pruebas para saber si oye kyrie. · Diabetes. Pregúntele a craig médico si debería hacerse pruebas para la diabetes. · Cáncer de colon. Usted debería empezar a [employer] de detección para el cáncer de colon a partir de los 48 Los deb. Usted podría hacerse bunny de las [address] [facility]. Craig médico le indicará con qué frecuencia debe hacerse estas pruebas según craig edad y riesgo. Los riesgos incluyen si ya le francis extraído un pólipo precanceroso del colon o si alguno de angelina padres, hermanos o hijos buchanan tenido cáncer de colon. · Enfermedad de la glándula tiroidea. Hable con craig médico acerca de si debe examinarse la glándula tiroidea cole parte de craig examen físico de rutina. Las [employer] tienen más probabilidad de tener problemas con la glándula tiroidea. · Osteoporosis. Valarie Lavender a hacerse pruebas de densidad ósea a los 72 años. Si es zoila de [facility], pregúntele a craig médico si tiene factores que pueden aumentar el riesgo de esta enfermedad. Starla Coronado [employer] prueba antes de los 72 Los deb. · Riesgo de ataque al corazón y ataque cerebral. Con bunny frecuencia de al menos 4 a 6 años, debería hacerse evaluar craig riesgo de tener un ataque al corazón y un ataque cerebral. Craig médico tiene en cuenta factores cole la edad, la presión arterial, el colesterol, si es fumador o si tiene diabetes para mostrar cuál es craig riesgo de tener un ataque al corazón o un ataque cerebral en los próximos 10 años. Cuándo debe pedir ayuda? Preste especial atención a los cambios en craig patt y asegúrese de comunicarse con craig médico si tiene problemas o síntomas que le preocupan. Dónde puede encontrar más información en inglés? Elder Slimmer a http://yazmin-yo.info/. Brenda Servant N008 en la búsqueda para aprender más acerca de \"Visita de control para mujeres de 50 a 72 años: Instrucciones de cuidado - [ Well Visit, Women 48 to 72: Care Instructions ]. \" 
Revisado: 12 mayo, 2017 Versión del contenido: 11.4 © 1156-5755 Healthwise, Incorporated. Las instrucciones de cuidado fueron adaptadas bajo licencia por [employer] (which disclaims liability or warranty for this information). Si usted tiene [place] afección médica o sobre estas instrucciones, siempre pregunte a barakat profesional de patt. Healthwise, Incorporated niega toda garantía o responsabilidad por barakat uso de esta información. Por favor proporcione marty resumen de la documentación de cuidado a barakat próximo proveedor. Your primary care clinician is listed as Mirza Warner. If you have any questions after today's visit, please call [phone].

## 2018-05-15 NOTE — PROGRESS NOTES
Coordination of Care  1. Have you been to the ER, urgent care clinic since your last visit? Hospitalized since your last visit? No    2. Have you seen or consulted any other health care providers outside of the [address] [place] [address] since your last visit? Include any pap smears or colon screening. No    Does the patient need refills? YES    Learning Assessment Complete?  yes

## 2018-05-15 NOTE — PATIENT INSTRUCTIONS
Visita de control para mujeres de 50 a 65 años: Instrucciones de cuidado - [ Well Visit, Women 48 to 72: Care Instructions ]  Instrucciones de cuidado    Los exámenes físicos pueden ayudarla a mantenerse saludable. Barakat médico buchanan revisado barakat estado general de patt y podría haberle dado algunos consejos para cuidarse. También podría haberle recomendado otros exámenes. En barakat hogar, usted puede ayudar a prevenir enfermedades si come de manera saludable, hace ejercicio con regularidad y sigue otras recomendaciones. La atención de seguimiento es bunny parte clave de barakat tratamiento y seguridad. Asegúrese de hacer y acudir a todas las citas, y llame a barakat médico si está teniendo problemas. También es bunny buena idea saber los resultados de los exámenes y mantener bunny lista de los medicamentos que ubaldo. ¿Cómo puede cuidarse en el [facility]? · Alcance un peso saludable y [place]. [place] disminuirá el riesgo de tener [employer], tales cole obesidad, diabetes, enfermedad cardíaca y presión arterial caitlyn. · Rebekah al menos 30 minutos de ejercicio la mayoría de los días de la [place]. Caminar es bunny buena opción. Es posible que prefiera hacer otras actividades, cole correr, nadar, [employer], o jugar al tenis u otros deportes de equipo. · No fume. Fumar puede [employer] problemas de la patt. Si necesita ayuda para dejar de fumar, hable con barakat médico sobre programas y medicamentos para dejar de fumar. Pueden aumentar angelina probabilidades de dejar el hábito para siempre. · Protéjase la piel del exceso de sol. [address] 10 a.m. y las 4 p.m., permanezca a la raul o Shivani Chu con prendas de vestir y un sombrero de ala ancha. Use gafas de sol que bloqueen los nishi ultravioleta. Póngase un protector solar de amplio espectro (SPF 30 o superior) en la piel expuesta, incluso cuando esté nublado. · Acuda al dentista [facility] veces al año para hacerse chequeos y limpiezas dentales.   · En el automóvil, use el cinturón de seguridad. · Limite el alcohol a 1 bebida al día. Beber alcohol en exceso puede causarle problemas de patt. Siga las recomendaciones de barakat médico acerca de cuándo hacerse determinadas pruebas. Estas pruebas pueden detectar problemas temprano. · Colesterol. Barakat médico le avisará con qué frecuencia debe hacerse esta prueba según barakat edad, angelina antecedentes familiares y otros factores que puedan incrementar el riesgo de ataque al corazón y ataque cerebral.  · Presión arterial. Hágase nathalie la presión arterial samson bunny visita de rutina al médico. Barakat médico le dirá con qué frecuencia debe revisarse la presión arterial según barakat edad, los [place] de barakat presión arterial y otros factores. · Mamografía. Pregúntele a barakat médico con qué frecuencia debe hacerse [employer]	[place]uni[place], la cual es bunny radiografía (nishi X) de los senos (mamas). Bunny mamografía puede detectar el cáncer de seno antes de que pueda palparse y en la etapa en que es más fácil de tratar. · Prueba de Papanicolaou y examen pélvico. Pregúntele a barakat médico con qué frecuencia debe hacerse bunny prueba de Papanicolaou. Es posible que no tenga que hacerse bunny prueba de Papanicolaou con la misma frecuencia de antes. · Visión. Hágase un chequeo de la visión cada troy o [address] [place], o con la frecuencia que barakat médico sugiera. Algunos expertos recomiendan que se ellen exámenes anuales para detectar glaucoma u otros problemas de la visión relacionados con la edad después de los 50 Los deb. · Audición. Avísele a barakat médico si nota algún cambio en barakat audición. Usted puede hacerse pruebas para saber si oye kyrie. · Diabetes. Pregúntele a barakat médico si debería hacerse pruebas para la diabetes. · Cáncer de colon. Usted debería empezar a [employer] de detección para el cáncer de colon a partir de los 48 Los deb. Usted podría hacerse bunny de las [facility].  Barakat médico le indicará con qué frecuencia debe hacerse estas pruebas según barakat edad y riesgo. Los riesgos incluyen si ya le francis extraído un pólipo precanceroso del colon o si alguno de angelina padres, hermanos o hijos buchanan tenido cáncer de colon. · Enfermedad de la glándula tiroidea. Hable con craig médico acerca de si debe examinarse la glándula tiroidea cole parte de craig examen físico de rutina. Las [employer] tienen más probabilidad de tener problemas con la glándula tiroidea. · Osteoporosis. Elmon Coffer a hacerse pruebas de densidad ósea a los 72 años. Si es zoila de [facility], pregúntele a craig médico si tiene factores que pueden aumentar el riesgo de esta enfermedad. Corita Mages [employer] prueba antes de los 72 Los deb. · Riesgo de ataque al corazón y ataque cerebral. Con bunny frecuencia de al menos 4 a 6 años, debería hacerse evaluar craig riesgo de tener un ataque al corazón y un ataque cerebral. Craig médico tiene en cuenta factores cole la edad, la presión arterial, el colesterol, si es fumador o si tiene diabetes para mostrar cuál es craig riesgo de tener un ataque al corazón o un ataque cerebral en los próximos 10 años. ¿Cuándo debe pedir ayuda? Preste especial atención a los cambios en craig patt y asegúrese de comunicarse con craig médico si tiene problemas o síntomas que le preocupan. ¿Dónde puede encontrar más información en inglés? Marvin File a [email or web address]-yo.[email or web address] Marce Waldron Z387 en la búsqueda para aprender más acerca de \"Visita de control para mujeres de 50 a 72 años: Instrucciones de cuidado - [ Well Visit, Women 48 to 72: Care Instructions ]. \"  Revisado: [address] [place] [place], 2017  Versión del contenido: 11.4  © 9999-4834 Healthwise, Incorporated. Las instrucciones de cuidado fueron adaptadas bajo licencia por [employer] (which disclaims liability or warranty for this information). Si usted tiene [place] afección médica o sobre estas instrucciones, siempre pregunte a craig profesional de patt.  Healthwise, Incorporated niega toda garantía o responsabilidad por craig uso de esta información.

## 2018-05-15 NOTE — PROGRESS NOTES
Assessment/Plan:    Diagnoses and all orders for this visit:    1. Well woman exam  -     PAP, LB, RFX HPV PLJOO([id number])    Pt agrees to [employer] referral, she is bilingual  Refer to [facility] for mammogram  Pap repeated for insufficient cells on last pap    Follow-up Disposition:  Return in about 1 year (around 5/15/2019). LEENA Noland Situ expressed understanding of this plan. An AVS was printed and given to the patient.      ----------------------------------------------------------------------    Chief Complaint   Patient presents with    Well Woman     Well woman exam       History of Present Illness:  Pt here for repeat pap for insufficient cells  She is having irregular periods- has started to have longer intervals between periods, they are lasting about 5 days. She is not having hot flashes, vaginal dryness, mood swings or changes in hair loss        No past medical history on file. Current Outpatient Prescriptions   Medication Sig Dispense Refill    diclofenac (VOLTAREN) 1 % gel Apply  to affected area four (4) times daily. 100 g 0    ferrous sulfate (IRON) 325 mg (65 mg iron) tablet Take 1 Tab by mouth Daily (before breakfast). Take with orange juice 90 Tab 1    loratadine (CLARITIN) 10 mg tablet Take 1 Tab by mouth daily. 30 Tab 6    traZODone (DESYREL) 50 mg tablet Take 1 Tab by mouth nightly. 30 Tab 2    acetaminophen (TYLENOL) 325 mg tablet Take 325 mg by mouth every four (4) hours as needed. No Known Allergies    Social History   Substance Use Topics    Smoking status: Never Smoker    Smokeless tobacco: Never Used    Alcohol use No       No family history on file.     Physical Exam:     Visit Vitals    /66 (BP 1 Location: Left arm, BP Patient Position: Sitting)    Pulse 65    Temp 98.2 °F (36.8 °C) (Oral)    Wt 160 lb (72.6 kg)    LMP 04/29/2018    BMI 26.82 kg/m2       A&Ox3  WDWN NAD  Respirations normal and non labored  Pelvic exam- she has an ingrown hair follicle with a small pustule.  She was instructed to use warm compresses on this   Her uterus is retro tipped, cervix and vagina are w/out lesion or abnl discharge  Pap repeated

## 2018-05-15 NOTE — ACP (ADVANCE CARE PLANNING)
Gave the patient a Honoring Choices folder in [place]. She prefers to have the discussion about the paperwork done in [place]. Confirmed the patient cell phone number. Advised her she will get a call from one of our nurses, after which she will be offered an appointment to come in for paperwork.

## 2018-05-24 ENCOUNTER — DOCUMENTATION ONLY (OUTPATIENT)
Dept: FAMILY MEDICINE CLINIC | Age: 53
End: 2018-05-24

## 2018-05-24 NOTE — PROGRESS NOTES
I called pt for follow up. She will schedule an appointment with me after she speaks with her son. She asked me to call her back next week and I will call her on Tuesday or Thursday.  Michela Bates RN

## 2018-05-31 NOTE — PROGRESS NOTES
HISTORY OF PRESENT ILLNESS  Edwin Moeller is a 47 y.o. female.   HPI    ROS    Physical Exam    ASSESSMENT and PLAN  {ASSESSMENT/PLAN:83621}

## 2018-05-31 NOTE — PROGRESS NOTES
I called pt on 5/31/18 and she had not talked with family member yet. She asked for call back on 6/8/18.  Jacque Quinones RN

## 2018-06-08 ENCOUNTER — DOCUMENTATION ONLY (OUTPATIENT)
Dept: FAMILY MEDICINE CLINIC | Age: 53
End: 2018-06-08

## 2018-06-08 NOTE — PROGRESS NOTES
I called pt today and she is now scheduled for ACP [facility] [place], [facility]. with me on 7/27/18 and will have family member present for conversation.  Monica Vora RN

## 2018-06-21 ENCOUNTER — HOSPITAL ENCOUNTER (OUTPATIENT)
Dept: MAMMOGRAPHY | Age: 53
Discharge: HOME | End: 2018-06-21
Payer: SELF-PAY

## 2018-06-21 DIAGNOSIS — Z12.31 VISIT FOR SCREENING MAMMOGRAM: ICD-10-CM

## 2018-06-21 PROCEDURE — 77067 SCR MAMMO BI INCL CAD: CPT

## 2018-08-16 ENCOUNTER — OFFICE VISIT (OUTPATIENT)
Dept: FAMILY PLANNING/WOMEN'S HEALTH CLINIC | Age: 53
End: 2018-08-16

## 2018-08-16 VITALS — SYSTOLIC BLOOD PRESSURE: 109 MMHG | DIASTOLIC BLOOD PRESSURE: 66 MMHG

## 2018-08-16 DIAGNOSIS — Z01.419 ENCOUNTER FOR WELL WOMAN EXAM: Primary | ICD-10-CM

## 2018-08-16 NOTE — PROGRESS NOTES
EVERY WOMANS LIFE HISTORY QUESTIONNAIRE       No Yes Comments   Has a doctor ever seen or felt anything wrong with your breast? [x]                                  []                                     Have you ever had a breast biopsy? [x]                                  []                                          When and where was last mammogram performed? 6/21/18 (came by mistake on her own without coming to [facility])  At [facility]    Have you ever been told that there was a problem on your mammogram?   No Yes Comments   [x]                                  []                                       Do you have breast implants? No Yes Comments   [x]                                  []                                       When was your last Pap test performed? 5/15/2018 at [facility] you ever had an abnormal Pap test?   No Yes Comments   [x]                                  []                                       Have you had a hysterectomy? No Yes Comments (why)   [x]                                  []                                       Have you ever been diagnosed with any type of Cancer   No Yes Comments (type,when,where,type of treatment   [x]                                  []                                          Has a family member been diagnosed with breast or ovarian cancer? No Yes Comments (which family members, and type   [x]                                  []                                       Did your mother take SABA? No Yes Unknown   [x]                                  []                                       Do you have a history of HIV exposure? No Yes    [x]                                  []                                         Have you been through menopause?    No Yes Date of LMP   [x]                                  []                                  7/28/18     Are you taking hormone replacement therapy (HRT)     No Yes Comments   [x] []                                       How many times have you been pregnant? 2      Number of live births ? 2    Are you experiencing any of the following? No Yes Comments   Nipple Discharge [x]                                  []                                     Breast Lump/Masses [x]                                  []                                     Breast Skin Changes [x]                                  []                                          No Yes Comments   Vaginal Discharge [x]                                  []                                     Abnormal/unusual vaginal bleeding [x]                                  []                                         Are you experiencing any other health problems?     Allergies, fibroids, migraines---followed at [facility]

## 2018-08-16 NOTE — PROGRESS NOTES
Assessment/Plan:    Diagnoses and all orders for this visit:    1. Encounter for well woman exam        Follow-up Disposition: Not on File    124 LEENA Jurado expressed understanding of this plan. An AVS was printed and given to the patient.      ----------------------------------------------------------------------    Chief Complaint   Patient presents with    Well Woman     [facility] visit-breast only       History of Present Illness:    Here for breast exam only for [facility] completion   She had pap and mammo within the past 3 months and both were neg (3 year f/up for pap discussed with pt today)  She does SBE \"sometimes\"  No gyn problems to discuss today      No past medical history on file. Current Outpatient Prescriptions   Medication Sig Dispense Refill    diclofenac (VOLTAREN) 1 % gel Apply  to affected area four (4) times daily. 100 g 0    ferrous sulfate (IRON) 325 mg (65 mg iron) tablet Take 1 Tab by mouth Daily (before breakfast). Take with orange juice 90 Tab 1    loratadine (CLARITIN) 10 mg tablet Take 1 Tab by mouth daily. 30 Tab 6    traZODone (DESYREL) 50 mg tablet Take 1 Tab by mouth nightly. 30 Tab 2    acetaminophen (TYLENOL) 325 mg tablet Take 325 mg by mouth every four (4) hours as needed. No Known Allergies    Social History   Substance Use Topics    Smoking status: Never Smoker    Smokeless tobacco: Never Used    Alcohol use No       No family history on file.     Physical Exam:     Visit Vitals    /66       A&Ox3  WDWN NAD  Respirations normal and non labored  Breast exam- bri neg for mass, tenderness, dimpling, skin color changes, retractions or nipple changes

## 2019-10-03 ENCOUNTER — OP HISTORICAL/CONVERTED ENCOUNTER (OUTPATIENT)
Dept: OTHER | Age: 54
End: 2019-10-03

## 2019-10-23 ENCOUNTER — OP HISTORICAL/CONVERTED ENCOUNTER (OUTPATIENT)
Dept: OTHER | Age: 54
End: 2019-10-23

## 2020-09-04 ENCOUNTER — TELEPHONE (OUTPATIENT)
Dept: PRIMARY CARE CLINIC | Age: 55
End: 2020-09-04

## 2020-09-04 DIAGNOSIS — J30.9 ALLERGIC RHINITIS, UNSPECIFIED SEASONALITY, UNSPECIFIED TRIGGER: ICD-10-CM

## 2020-09-09 RX ORDER — LORATADINE 10 MG/1
10 TABLET ORAL DAILY
Qty: 30 TAB | Refills: 99 | Status: SHIPPED | OUTPATIENT
Start: 2020-09-09 | End: 2022-03-21

## 2020-10-05 ENCOUNTER — OFFICE VISIT (OUTPATIENT)
Dept: PRIMARY CARE CLINIC | Age: 55
End: 2020-10-05
Payer: COMMERCIAL

## 2020-10-05 VITALS
BODY MASS INDEX: 27.32 KG/M2 | RESPIRATION RATE: 16 BRPM | DIASTOLIC BLOOD PRESSURE: 80 MMHG | WEIGHT: 170 LBS | OXYGEN SATURATION: 98 % | TEMPERATURE: 97.6 F | HEIGHT: 66 IN | HEART RATE: 73 BPM | SYSTOLIC BLOOD PRESSURE: 140 MMHG

## 2020-10-05 DIAGNOSIS — K21.9 GASTROESOPHAGEAL REFLUX DISEASE, UNSPECIFIED WHETHER ESOPHAGITIS PRESENT: ICD-10-CM

## 2020-10-05 DIAGNOSIS — Z00.00 ENCOUNTER FOR ANNUAL HEALTH EXAMINATION: Primary | ICD-10-CM

## 2020-10-05 DIAGNOSIS — B97.7 HPV (HUMAN PAPILLOMA VIRUS) INFECTION: ICD-10-CM

## 2020-10-05 DIAGNOSIS — I83.813 VARICOSE VEINS OF BILATERAL LOWER EXTREMITIES WITH PAIN: ICD-10-CM

## 2020-10-05 PROCEDURE — 99396 PREV VISIT EST AGE 40-64: CPT | Performed by: NURSE PRACTITIONER

## 2020-10-05 PROCEDURE — 81003 URINALYSIS AUTO W/O SCOPE: CPT | Performed by: NURSE PRACTITIONER

## 2020-10-05 NOTE — PROGRESS NOTES
HISTORY OF PRESENT ILLNESS  Juan M Boyle is a 63 y.o. female presents for   Chief Complaint   Patient presents with    Abdominal Pain    GERD    Medication Refill      Annual physical.    Requesting medication refill on allergy medication (clarition). Also needs a pap and breast exam.    Vitals:    10/05/20 1328   BP: (!) 140/80   BP 1 Location: Right arm   BP Patient Position: Sitting   Pulse: 73   Resp: 16   Temp: 97.6 °F (36.4 °C)   TempSrc: Oral   SpO2: 98%   Weight: 170 lb (77.1 kg)   Height: 5' 6\" (1.676 m)      There is no problem list on file for this patient. There are no active problems to display for this patient. Current Outpatient Medications   Medication Sig Dispense Refill    Comp Stocking,Knee,Regular,Sml misc 1 Units by Does Not Apply route daily. 2 Units 1    loratadine (CLARITIN) 10 mg tablet Take 1 Tab by mouth daily. 30 Tab 99    diclofenac (VOLTAREN) 1 % gel Apply  to affected area four (4) times daily. 100 g 0    traZODone (DESYREL) 50 mg tablet Take 1 Tab by mouth nightly. 30 Tab 2    acetaminophen (TYLENOL) 325 mg tablet Take 325 mg by mouth every four (4) hours as needed.  ferrous sulfate (IRON) 325 mg (65 mg iron) tablet Take 1 Tab by mouth Daily (before breakfast). Take with orange juice 90 Tab 1     No Known Allergies  Past Medical History:   Diagnosis Date    History of seasonal allergies      Past Surgical History:   Procedure Laterality Date    HX GYN           Family History   Problem Relation Age of Onset    Heart Disease Mother     Elevated Lipids Father     Heart Disease Father     Stroke Father      Social History     Tobacco Use    Smoking status: Never Smoker    Smokeless tobacco: Never Used   Substance Use Topics    Alcohol use: No           Review of Systems   Constitutional: Negative for chills, fever and weight loss. HENT: Negative for congestion, ear pain, hearing loss and sore throat.     Eyes: Negative for blurred vision and double vision. Respiratory: Negative for cough. Cardiovascular: Negative for chest pain and palpitations. Gastrointestinal: Positive for heartburn (heartburn with coffee, has decreased intake). Negative for constipation, diarrhea, nausea and vomiting. Genitourinary: Negative for dysuria, frequency and urgency. Musculoskeletal: Positive for myalgias (some muscle aches due to cholestrol medication ). Negative for joint pain. Skin: Negative for rash. Neurological: Negative for dizziness, weakness and headaches. Endo/Heme/Allergies: Does not bruise/bleed easily. Psychiatric/Behavioral: Negative for depression. Physical Exam  Vitals signs reviewed. Exam conducted with a chaperone present. Constitutional:       Appearance: Normal appearance. HENT:      Head: Normocephalic. Neck:      Musculoskeletal: Normal range of motion. Cardiovascular:      Rate and Rhythm: Normal rate and regular rhythm. Pulses: Normal pulses. Heart sounds: Normal heart sounds. Pulmonary:      Effort: Pulmonary effort is normal.      Breath sounds: Normal breath sounds. Chest:      Chest wall: No mass. Breasts:         Right: No swelling, bleeding, inverted nipple, mass, nipple discharge, skin change or tenderness. Left: No swelling, bleeding, inverted nipple, mass, nipple discharge, skin change or tenderness. Abdominal:      General: Abdomen is flat. Palpations: Abdomen is soft. Tenderness: There is no abdominal tenderness. Genitourinary:     General: Normal vulva. Labia:         Right: No rash or tenderness. Left: No rash or tenderness. Vagina: Vaginal discharge (non foul milk-white thin vaginal discharg ) present. Rectum: Normal.      Comments: nontender bimanual pelvic exam to external/internal genitalia. Denies urinary complaints. Musculoskeletal: Normal range of motion. Skin:     General: Skin is warm and dry.    Neurological:      Mental Status: She is alert and oriented to person, place, and time. Mental status is at baseline. Psychiatric:         Mood and Affect: Mood normal.         Behavior: Behavior normal.           ASSESSMENT and PLAN  Diagnoses and all orders for this visit:    1. Encounter for annual health examination  -     PAP IG, CT-NG TV HPV 16&18,45 ([id number], [id number])  -     CA SCREEN;PELVIC/BREAST EXAM  -     CBC WITH AUTOMATED DIFF  -     METABOLIC PANEL, COMPREHENSIVE  -     LIPID PANEL  -     HEPATITIS C AB  -     AMB POC URINALYSIS DIP STICK AUTO W/O MICRO  -     Comp Stocking,Knee,Regular,Sml misc; 1 Units by Does Not Apply route daily. Dispense: 2 Units; Refill: 1     Varicose veins to bilateral lower extremities that is bother some to patient, educated her use of compression stockings. Educated patient on monthly breast exams, no masses/lumps noted on physical in office breast exam.  History of fibroids, pelvic exam unremarkable. Patient to follow up with OB/GYN if needed.     Tanya Speak     Signed By: Kiesha Valverde NP     October 6, 2020

## 2020-10-06 PROBLEM — I83.813 VARICOSE VEINS OF BILATERAL LOWER EXTREMITIES WITH PAIN: Status: ACTIVE | Noted: 2020-10-06

## 2020-10-06 PROBLEM — K21.9 GASTROESOPHAGEAL REFLUX DISEASE: Status: ACTIVE | Noted: 2020-10-06

## 2020-10-06 LAB
ALBUMIN SERPL-MCNC: 4.4 G/DL (ref 3.8–4.9)
ALBUMIN/GLOB SERPL: 1.4 {RATIO} (ref 1.2–2.2)
ALP SERPL-CCNC: 100 IU/L (ref 39–117)
ALT SERPL-CCNC: 11 IU/L (ref 0–32)
AST SERPL-CCNC: 12 IU/L (ref 0–40)
BASOPHILS # BLD AUTO: 0 X10E3/UL (ref 0–0.2)
BASOPHILS NFR BLD AUTO: 1 %
BILIRUB SERPL-MCNC: 0.3 MG/DL (ref 0–1.2)
BILIRUB UR QL STRIP: NEGATIVE
BUN SERPL-MCNC: 11 MG/DL (ref 6–24)
BUN/CREAT SERPL: 15 (ref 9–23)
CALCIUM SERPL-MCNC: 9.4 MG/DL (ref 8.7–10.2)
CHLORIDE SERPL-SCNC: 103 MMOL/L (ref 96–106)
CHOLEST SERPL-MCNC: 222 MG/DL (ref 100–199)
CO2 SERPL-SCNC: 26 MMOL/L (ref 20–29)
CREAT SERPL-MCNC: 0.74 MG/DL (ref 0.57–1)
EOSINOPHIL # BLD AUTO: 0.1 X10E3/UL (ref 0–0.4)
EOSINOPHIL NFR BLD AUTO: 1 %
ERYTHROCYTE [DISTWIDTH] IN BLOOD BY AUTOMATED COUNT: 12.8 % (ref 11.7–15.4)
GLOBULIN SER CALC-MCNC: 3.2 G/DL (ref 1.5–4.5)
GLUCOSE SERPL-MCNC: 98 MG/DL (ref 65–99)
GLUCOSE UR-MCNC: NEGATIVE MG/DL
HCT VFR BLD AUTO: 35.7 % (ref 34–46.6)
HCV AB S/CO SERPL IA: <0.1 S/CO RATIO (ref 0–0.9)
HDLC SERPL-MCNC: 54 MG/DL
HGB BLD-MCNC: 12 G/DL (ref 11.1–15.9)
IMM GRANULOCYTES # BLD AUTO: 0 X10E3/UL (ref 0–0.1)
IMM GRANULOCYTES NFR BLD AUTO: 0 %
KETONES P FAST UR STRIP-MCNC: NEGATIVE MG/DL
LDLC SERPL CALC-MCNC: 125 MG/DL (ref 0–99)
LYMPHOCYTES # BLD AUTO: 1.7 X10E3/UL (ref 0.7–3.1)
LYMPHOCYTES NFR BLD AUTO: 27 %
MCH RBC QN AUTO: 30.5 PG (ref 26.6–33)
MCHC RBC AUTO-ENTMCNC: 33.6 G/DL (ref 31.5–35.7)
MCV RBC AUTO: 91 FL (ref 79–97)
MONOCYTES # BLD AUTO: 0.6 X10E3/UL (ref 0.1–0.9)
MONOCYTES NFR BLD AUTO: 10 %
NEUTROPHILS # BLD AUTO: 3.7 X10E3/UL (ref 1.4–7)
NEUTROPHILS NFR BLD AUTO: 61 %
PH UR STRIP: 7.5 [PH] (ref 4.6–8)
PLATELET # BLD AUTO: 275 X10E3/UL (ref 150–450)
POTASSIUM SERPL-SCNC: 3.8 MMOL/L (ref 3.5–5.2)
PROT SERPL-MCNC: 7.6 G/DL (ref 6–8.5)
PROT UR QL STRIP: NEGATIVE
RBC # BLD AUTO: 3.94 X10E6/UL (ref 3.77–5.28)
SODIUM SERPL-SCNC: 141 MMOL/L (ref 134–144)
SP GR UR STRIP: 1.02 (ref 1–1.03)
TRIGL SERPL-MCNC: 242 MG/DL (ref 0–149)
UA UROBILINOGEN AMB POC: NORMAL (ref 0.2–1)
URINALYSIS CLARITY POC: NORMAL
URINALYSIS COLOR POC: YELLOW
URINE BLOOD POC: NEGATIVE
URINE LEUKOCYTES POC: NEGATIVE
URINE NITRITES POC: NEGATIVE
VLDLC SERPL CALC-MCNC: 43 MG/DL (ref 5–40)
WBC # BLD AUTO: 6 X10E3/UL (ref 3.4–10.8)

## 2020-10-06 RX ORDER — FAMOTIDINE 40 MG/1
40 TABLET, FILM COATED ORAL
Qty: 90 TAB | Refills: 5 | Status: SHIPPED | OUTPATIENT
Start: 2020-10-06 | End: 2022-03-21

## 2020-10-06 NOTE — PATIENT INSTRUCTIONS
Learning About Physical Activity  What is physical activity? Physical activity is any kind of activity that gets your body moving. The types of physical activity that can help you get fit and stay healthy include:  · Aerobic or \"cardio\" activities that make your heart beat faster and make you breathe harder, such as brisk walking, riding a bike, or running. Aerobic activities strengthen your heart and lungs and build up your endurance. · Strength training activities that make your muscles work against, or \"resist,\" something, such as lifting weights or doing push-ups. These activities help tone and strengthen your muscles. · Stretches that allow you to move your joints and muscles through their full range of motion. Stretching helps you be more flexible and avoid injury. What are the benefits of physical activity? Being active is one of the best things you can do for your health. It helps you to:  · Feel stronger and have more energy to do all the things you like to do. · Focus better at school or work. · Feel, think, and sleep better. · Reach and stay at a healthy weight. · Lose fat and build lean muscle. · Lower your risk for serious health problems. · Keep your bones, muscles, and joints strong. How can you make physical activity part of your life? Get at least 30 minutes of exercise on most days of the week. Walking is a good choice. You also may want to do other activities, such as running, swimming, cycling, or playing tennis or team sports. Pick activities that you like--ones that make your heart beat faster, your muscles stronger, and your muscles and joints more flexible. If you find more than one thing you like doing, do them all. You don't have to do the same thing every day. Get your heart pumping every day. Any activity that makes your heart beat faster and keeps it at that rate for a while counts.   Here are some great ways to get your heart beating faster:  · Go for a brisk walk, run, or bike ride. · Go for a hike or swim. · Go in-line skating. · Play a game of touch football, basketball, or soccer. · Ride a bike. · Play tennis or racquetball. · Climb stairs. Even some household chores can be aerobic--just do them at a faster pace. Vacuuming, raking or mowing the lawn, sweeping the garage, and washing and waxing the car all can help get your heart rate up. Strengthen your muscles during the week. You don't have to lift heavy weights or grow big, bulky muscles to get stronger. Doing a few simple activities that make your muscles work against, or \"resist,\" something can help you get stronger. For example, you can:  · Do push-ups or sit-ups, which use your own body weight as resistance. · Lift weights or dumbbells or use stretch bands at home or in a gym or community center. Stretch your muscles often. Stretching will help you as you become more active. It can help you stay flexible, loosen tight muscles, and avoid injury. It can also help improve your balance and posture and can be a great way to relax. Be sure to stretch the muscles you'll be using when you work out. It's best to warm your muscles slightly before you stretch them. Walk or do some other light aerobic activity for a few minutes, and then start stretching. When you stretch your muscles:  · Do it slowly. Stretching is not about going fast or making sudden movements. · Don't push or bounce during a stretch. · Hold each stretch for at least 15 to 30 seconds, if you can. You should feel a stretch in the muscle, but not pain. · Breathe out as you do the stretch. Then breathe in as you hold the stretch. Don't hold your breath. If you're worried about how more activity might affect your health, have a checkup before you start. Follow any special advice your doctor gives you for getting a smart start. Where can you learn more?   Go to http://www.gray.com/  Enter [id number] in the search box to learn more about \"Learning About Physical Activity. \"  Current as of: January 16, 2020               Content Version: 12.6  © 0380-9698 "[employer] Incorporated. Care instructions adapted under license by [employer] (which disclaims liability or warranty for this information). If you have questions about a medical condition or this instruction, always ask your healthcare professional. [address] any warranty or liability for your use of this information.

## 2020-10-12 ENCOUNTER — HOSPITAL ENCOUNTER (OUTPATIENT)
Dept: MAMMOGRAPHY | Age: 55
Discharge: HOME | End: 2020-10-12
Attending: NURSE PRACTITIONER
Payer: COMMERCIAL

## 2020-10-12 DIAGNOSIS — Z12.31 SCREENING MAMMOGRAM FOR HIGH-RISK PATIENT: ICD-10-CM

## 2020-10-12 PROCEDURE — 77067 SCR MAMMO BI INCL CAD: CPT

## 2020-10-16 LAB
C TRACH RRNA CVX QL NAA+PROBE: NEGATIVE
CYTOLOGIST CVX/VAG CYTO: ABNORMAL
CYTOLOGY CVX/VAG DOC CYTO: ABNORMAL
CYTOLOGY CVX/VAG DOC THIN PREP: ABNORMAL
DX ICD CODE: ABNORMAL
HPV I/H RISK 1 DNA CVX QL PROBE+SIG AMP: POSITIVE
HPV16 DNA CVX QL PROBE+SIG AMP: POSITIVE
HPV18 DNA CVX QL PROBE+SIG AMP: NEGATIVE
Lab: ABNORMAL
N GONORRHOEA RRNA CVX QL NAA+PROBE: NEGATIVE
OTHER STN SPEC: ABNORMAL
STAT OF ADQ CVX/VAG CYTO-IMP: ABNORMAL
T VAGINALIS RRNA SPEC QL NAA+PROBE: NEGATIVE

## 2020-10-20 ENCOUNTER — TELEPHONE (OUTPATIENT)
Dept: PRIMARY CARE CLINIC | Age: 55
End: 2020-10-20

## 2020-10-21 NOTE — PROGRESS NOTES
Please call patient about labs.      Already spoken to patient; sending to [facility] for follow up

## 2021-01-18 ENCOUNTER — TELEPHONE (OUTPATIENT)
Dept: PRIMARY CARE CLINIC | Age: 56
End: 2021-01-18

## 2021-01-18 NOTE — TELEPHONE ENCOUNTER
Patient was referred to an ob doctor and patient still has not received the referral to the specialist as of yet and wants to know when will this be done and taken care of so she can see the ob doctor she was referred to she can be reached at [phone].

## 2021-04-15 ENCOUNTER — OFFICE VISIT (OUTPATIENT)
Dept: OBGYN CLINIC | Age: 56
End: 2021-04-15
Payer: COMMERCIAL

## 2021-04-15 VITALS
HEART RATE: 85 BPM | BODY MASS INDEX: 27.8 KG/M2 | OXYGEN SATURATION: 100 % | HEIGHT: 66 IN | WEIGHT: 173 LBS | SYSTOLIC BLOOD PRESSURE: 108 MMHG | RESPIRATION RATE: 16 BRPM | DIASTOLIC BLOOD PRESSURE: 74 MMHG

## 2021-04-15 DIAGNOSIS — B97.7 HIGH RISK HUMAN PAPILLOMA VIRUS (HPV) INFECTION OF CERVIX: Primary | ICD-10-CM

## 2021-04-15 DIAGNOSIS — N72 HIGH RISK HUMAN PAPILLOMA VIRUS (HPV) INFECTION OF CERVIX: Primary | ICD-10-CM

## 2021-04-15 PROCEDURE — 99203 OFFICE O/P NEW LOW 30 MIN: CPT | Performed by: OBSTETRICS & GYNECOLOGY

## 2021-04-15 PROCEDURE — 57456 ENDOCERV CURETTAGE W/SCOPE: CPT | Performed by: OBSTETRICS & GYNECOLOGY

## 2021-04-20 LAB
CPT CODES, 490044: NORMAL
CPT DISCLAIMER: NORMAL
CYTOLOGY SPEC DOC CYTO: NORMAL
DIAGNOSIS SYNOPSIS:: NORMAL
DX ICD CODE: NORMAL
PATH REPORT.GROSS SPEC: NORMAL
PATH REPORT.RELEVANT HX SPEC: NORMAL
PATHOLOGIST NAME: NORMAL
SPECIMEN SOURCE: NORMAL

## 2021-04-26 NOTE — PROGRESS NOTES
Kat Montero is a 63 y.o. female, , Patient's last menstrual period was 2020., who presents today for the following:  Chief Complaint   Patient presents with    Colposcopy     neg pap hpv+        No Known Allergies    Current Outpatient Medications   Medication Sig    famotidine (PEPCID) 40 mg tablet Take 1 Tab by mouth nightly.  loratadine (CLARITIN) 10 mg tablet Take 1 Tab by mouth daily.  Comp Stocking,Knee,Regular,Sml misc 1 Units by Does Not Apply route daily.  diclofenac (VOLTAREN) 1 % gel Apply  to affected area four (4) times daily.  ferrous sulfate (IRON) 325 mg (65 mg iron) tablet Take 1 Tab by mouth Daily (before breakfast). Take with orange juice    traZODone (DESYREL) 50 mg tablet Take 1 Tab by mouth nightly.  acetaminophen (TYLENOL) 325 mg tablet Take 325 mg by mouth every four (4) hours as needed. No current facility-administered medications for this visit.         Past Medical History:   Diagnosis Date    Fibroid, uterine     History of seasonal allergies        Past Surgical History:   Procedure Laterality Date    HX GYN             Family History   Problem Relation Age of Onset    Heart Disease Mother     Elevated Lipids Father     Heart Disease Father     Stroke Father        Social History     Socioeconomic History    Marital status:      Spouse name: Not on file    Number of children: Not on file    Years of education: Not on file    Highest education level: Not on file   Occupational History    Not on file   Social Needs    Financial resource strain: Not on file    Food insecurity     Worry: Not on file     Inability: Not on file    Transportation needs     Medical: Not on file     Non-medical: Not on file   Tobacco Use    Smoking status: Never Smoker    Smokeless tobacco: Never Used   Substance and Sexual Activity    Alcohol use: No    Drug use: No    Sexual activity: Yes   Lifestyle    Physical activity     Days per week: Not on file     Minutes per session: Not on file    Stress: Not on file   Relationships    Social connections     Talks on phone: Not on file     Gets together: Not on file     Attends Worship service: Not on file     Active member of club or organization: Not on file     Attends meetings of clubs or organizations: Not on file     Relationship status: Not on file    Intimate partner violence     Fear of current or ex partner: Not on file     Emotionally abused: Not on file     Physically abused: Not on file     Forced sexual activity: Not on file   Other Topics Concern    Not on file   Social History Narrative    Not on file         HPI   colpscopy secondary to persistent hpv  No known history of abnormal pap      Review of Systems   Constitutional: Negative. Respiratory: Negative. Cardiovascular: Negative. Gastrointestinal: Negative. Genitourinary: Negative. Musculoskeletal: Negative. Skin: Negative. Neurological: Negative. Endo/Heme/Allergies: Negative. Psychiatric/Behavioral: Negative. All other systems reviewed and are negative. /74 (BP 1 Location: Left upper arm, BP Patient Position: Sitting)   Pulse 85   Resp 16   Ht 5' 6\" (1.676 m)   Wt 173 lb (78.5 kg)   LMP 06/11/2020   SpO2 100%   BMI 27.92 kg/m²    OBGyn Exam   PE:  Constitutional: General Appearance: healthy-appearing, well-nourished, well-developed, and well groomed. Psychiatric: Orientation: to time, place, and person. Mood and Affect: normal mood and affect and appropriate and active and alert. Abdomen: Auscultation/Inspection/Palpation: tenderness and mass and non-distended. Hernia: none palpated. Female Genitalia: Vulva: no masses, atrophy, or lesions. Bladder/Urethra: no urethral discharge or mass and normal meatus and bladder non distended. Vagina no tenderness, erythema, cystocele, rectocele, abnormal vaginal discharge, or vesicle(s) or ulcers.      Cervix: no discharge or cervical motion tenderness and grossly normal.     Uterus: mobile, non-tender, and no uterine prolapse. Adnexa/Parametria: no adnexal tenderness. Colposcopy Procedure Note    Indications:   Most recent Pap smear negative with persistent HPV    Procedure Details   The risks and benefits of the procedure and written informed consent obtained. Speculum placed in vagina and excellent visualization of cervix achieved, cervix swabbed x 3 with acetic acid solution. Findings:  Cervix: no visible lesions, no mosaicism, no punctation and no abnormal vasculature; endocervical speculum placed, SCJ visualized 360 degrees without lesions, no biopsies taken and endocervical curettage performed. Vaginal inspection: normal without visible lesions. Vulvar colposcopy: vulvar colposcopy not performed. Specimens: ecc    Complications: none. Plan:  Specimens labelled and sent to Pathology. Will base further treatment on Pathology findings. Return to discuss Pathology results in 2 weeks. .      1. High risk human papilloma virus (HPV) infection of cervix    - SURGICAL PATHOLOGY        Follow-up and Dispositions    · Return in about 2 weeks (around 4/29/2021) for gyn.

## 2021-07-28 ENCOUNTER — TELEPHONE (OUTPATIENT)
Dept: OBGYN CLINIC | Age: 56
End: 2021-07-28

## 2021-07-28 NOTE — TELEPHONE ENCOUNTER
Patient contacted the office requesting information on lab results from 4/15/2021. Please give her a call back.

## 2021-07-29 DIAGNOSIS — Z12.31 ENCOUNTER FOR SCREENING MAMMOGRAM FOR BREAST CANCER: Primary | ICD-10-CM

## 2021-10-14 ENCOUNTER — HOSPITAL ENCOUNTER (OUTPATIENT)
Dept: MAMMOGRAPHY | Age: 56
Discharge: HOME | End: 2021-10-14
Attending: OBSTETRICS & GYNECOLOGY
Payer: COMMERCIAL

## 2021-10-14 DIAGNOSIS — Z12.31 ENCOUNTER FOR SCREENING MAMMOGRAM FOR BREAST CANCER: ICD-10-CM

## 2021-10-14 PROCEDURE — 77063 BREAST TOMOSYNTHESIS BI: CPT

## 2022-03-19 PROBLEM — I83.813 VARICOSE VEINS OF BILATERAL LOWER EXTREMITIES WITH PAIN: Status: ACTIVE | Noted: 2020-10-06

## 2022-03-19 PROBLEM — K21.9 GASTROESOPHAGEAL REFLUX DISEASE: Status: ACTIVE | Noted: 2020-10-06

## 2022-03-21 ENCOUNTER — OFFICE VISIT (OUTPATIENT)
Dept: OBGYN CLINIC | Age: 57
End: 2022-03-21
Payer: COMMERCIAL

## 2022-03-21 VITALS
OXYGEN SATURATION: 98 % | WEIGHT: 170 LBS | SYSTOLIC BLOOD PRESSURE: 117 MMHG | DIASTOLIC BLOOD PRESSURE: 52 MMHG | RESPIRATION RATE: 16 BRPM | HEIGHT: 66 IN | HEART RATE: 57 BPM | BODY MASS INDEX: 27.32 KG/M2

## 2022-03-21 DIAGNOSIS — N64.4 BREAST PAIN: Primary | ICD-10-CM

## 2022-03-21 PROCEDURE — 99213 OFFICE O/P EST LOW 20 MIN: CPT | Performed by: OBSTETRICS & GYNECOLOGY

## 2022-03-21 RX ORDER — DICLOFENAC SODIUM 10 MG/G
GEL TOPICAL 4 TIMES DAILY
Qty: 150 G | Refills: 0 | Status: SHIPPED | OUTPATIENT
Start: 2022-03-21

## 2022-03-21 NOTE — PROGRESS NOTES
Taniya Ortez is a 61 y.o. female, , No LMP recorded. (Menstrual status: Menopause). , who presents today for the following:  Chief Complaint   Patient presents with    Breast Problem        No Known Allergies    Current Outpatient Medications   Medication Sig    diclofenac (VOLTAREN) 1 % gel Apply  to affected area four (4) times daily.  diclofenac (VOLTAREN) 1 % gel Apply  to affected area four (4) times daily. (Patient not taking: Reported on 3/21/2022)     No current facility-administered medications for this visit. Past Medical History:   Diagnosis Date    Fibroid, uterine     History of seasonal allergies        Past Surgical History:   Procedure Laterality Date    HX GYN             Family History   Problem Relation Age of Onset    Heart Disease Mother     Elevated Lipids Father     Heart Disease Father     Stroke Father        Social History     Socioeconomic History    Marital status:      Spouse name: Not on file    Number of children: Not on file    Years of education: Not on file    Highest education level: Not on file   Occupational History    Not on file   Tobacco Use    Smoking status: Never Smoker    Smokeless tobacco: Never Used   Substance and Sexual Activity    Alcohol use: No    Drug use: No    Sexual activity: Yes   Other Topics Concern    Not on file   Social History Narrative    Not on file     Social Determinants of Health     Financial Resource Strain:     Difficulty of Paying Living Expenses: Not on file   Food Insecurity:     Worried About Running Out of Food in the Last Year: Not on file    Yong of Food in the Last Year: Not on file   Transportation Needs:     Lack of Transportation (Medical): Not on file    Lack of Transportation (Non-Medical):  Not on file   Physical Activity:     Days of Exercise per Week: Not on file    Minutes of Exercise per Session: Not on file   Stress:     Feeling of Stress : Not on file   Social Connections:     Frequency of Communication with Friends and Family: Not on file    Frequency of Social Gatherings with Friends and Family: Not on file    Attends Oriental orthodox Services: Not on file    Active Member of Clubs or Organizations: Not on file    Attends Club or Organization Meetings: Not on file    Marital Status: Not on file   Intimate Partner Violence:     Fear of Current or Ex-Partner: Not on file    Emotionally Abused: Not on file    Physically Abused: Not on file    Sexually Abused: Not on file   Housing Stability:     Unable to Pay for Housing in the Last Year: Not on file    Number of [place] in the Last Year: Not on file    Unstable Housing in the Last Year: Not on file         HPI      Review of Systems   Constitutional: Negative. Respiratory: Negative. Cardiovascular: Negative. Gastrointestinal: Negative. Genitourinary: Negative. Musculoskeletal: Negative. Skin: Negative. Neurological: Negative. Endo/Heme/Allergies: Negative. Psychiatric/Behavioral: Negative. All other systems reviewed and are negative. BP (!) 117/52 (BP 1 Location: Left upper arm, BP Patient Position: Sitting)   Pulse (!) 57   Resp 16   Ht 5' 6\" (1.676 m)   Wt 170 lb (77.1 kg)   SpO2 98%   BMI 27.44 kg/m²    OBGyn Exam   Constitutional:     General Appearance: healthy-appearing, well-nourished, and well-developed; Level of Distress: NAD. Ambulation: ambulating normally. Psychiatric:   Insight: good judgement. Mental Status: normal mood and affect and active and alert. Orientation: to time, place, and person. Memory: recent memory normal and remote memory normal.     Head: Head: normocephalic and atraumatic. Neck:   Neck: supple, FROM, trachea midline, and no masses. Lymph Nodes: no cervical LAD, supraclavicular LAD, axillary LAD, or inguinal LAD. Thyroid: no enlargement or nodules and non-tender. Lungs:   Respiratory effort: no dyspnea. Cardiovascular:     Pulses including femoral / pedal: normal throughout. Breast: Breast:  Small pea size mass in the left outer breast, no slin changes or abnormal secretions and normal appearance. Skin:   Inspection and palpation: no rash, lesions, ulcer, induration, nodules, jaundice, or abnormal nevi and good turgor. Nails: normal.       1. Breast pain    - JEANMARIE 3D KEITH W MAMMO BI DX INCL CAD; Future  - diclofenac (VOLTAREN) 1 % gel; Apply  to affected area four (4) times daily.   Dispense: 150 g; Refill: 0

## 2022-04-06 DIAGNOSIS — N64.4 BREAST PAIN: Primary | ICD-10-CM

## 2022-04-11 ENCOUNTER — HOSPITAL ENCOUNTER (OUTPATIENT)
Dept: MAMMOGRAPHY | Age: 57
Discharge: HOME | End: 2022-04-11
Attending: OBSTETRICS & GYNECOLOGY
Payer: COMMERCIAL

## 2022-04-11 DIAGNOSIS — N64.4 BREAST PAIN: ICD-10-CM

## 2022-04-11 PROCEDURE — 77062 BREAST TOMOSYNTHESIS BI: CPT

## 2022-09-08 ENCOUNTER — HOSPITAL ENCOUNTER (EMERGENCY)
Age: 57
Discharge: HOME | End: 2022-09-08
Attending: EMERGENCY MEDICINE
Payer: COMMERCIAL

## 2022-09-08 VITALS
HEIGHT: 66 IN | RESPIRATION RATE: 12 BRPM | BODY MASS INDEX: 27.1 KG/M2 | TEMPERATURE: 98.1 F | SYSTOLIC BLOOD PRESSURE: 126 MMHG | OXYGEN SATURATION: 98 % | WEIGHT: 168.6 LBS | DIASTOLIC BLOOD PRESSURE: 80 MMHG

## 2022-09-08 DIAGNOSIS — B02.9 HERPES ZOSTER WITHOUT COMPLICATION: Primary | ICD-10-CM

## 2022-09-08 PROCEDURE — 74011250637 HC RX REV CODE- 250/637: Performed by: EMERGENCY MEDICINE

## 2022-09-08 PROCEDURE — 99283 EMERGENCY DEPT VISIT LOW MDM: CPT

## 2022-09-08 RX ORDER — LIDOCAINE 50 MG/G
1 PATCH TOPICAL EVERY 24 HOURS
Qty: 5 PATCH | Refills: 0 | Status: SHIPPED | OUTPATIENT
Start: 2022-09-08 | End: 2022-10-12 | Stop reason: SDUPTHER

## 2022-09-08 RX ORDER — VALACYCLOVIR HYDROCHLORIDE 1 G/1
1000 TABLET, FILM COATED ORAL 3 TIMES DAILY
Qty: 21 TABLET | Refills: 0 | Status: SHIPPED | OUTPATIENT
Start: 2022-09-08 | End: 2022-09-15

## 2022-09-08 RX ORDER — IBUPROFEN 600 MG/1
600 TABLET ORAL
Qty: 30 TABLET | Refills: 0 | Status: SHIPPED
Start: 2022-09-08 | End: 2022-10-12

## 2022-09-08 RX ORDER — ACETAMINOPHEN 500 MG
1000 TABLET ORAL 3 TIMES DAILY
Qty: 24 TABLET | Refills: 0 | Status: SHIPPED | OUTPATIENT
Start: 2022-09-08 | End: 2022-09-12

## 2022-09-08 RX ORDER — VALACYCLOVIR HYDROCHLORIDE 500 MG/1
1000 TABLET, FILM COATED ORAL
Status: COMPLETED | OUTPATIENT
Start: 2022-09-08 | End: 2022-09-08

## 2022-09-08 RX ADMIN — VALACYCLOVIR HYDROCHLORIDE 1000 MG: 500 TABLET, FILM COATED ORAL at 21:02

## 2022-09-08 NOTE — Clinical Note
[address] [facility] EM[facility] DEPT  [address] [id number]  [phone]    Work/School Note    Date: 9/8/2022    To Whom It May concern:    Cathy Maier was seen and treated today in the emergency room by the following provider(s):  Attending Provider: Karl Anguiano MD.      Cathy Maier is excused from work/school on 9/8/2022 through 9/10/2022. She is medically clear to return to work/school on 9/11/2022.          Sincerely,          Reinaldo Manzanares MD

## 2022-09-08 NOTE — Clinical Note
[address]  [facility]PT  [address] [id number] [phone]    Work/School Note    Date: 9/8/2022    To Whom It May concern:    Rebeca Garcia was seen and treated today in the emergency room by the following provider(s):  Attending Provider: Hazel Burgess MD.      Rebeca Garcia is excused from work/school on 9/8/2022 through 9/10/2022. She is medically clear to return to work/school on 9/11/2022.          Sincerely,          Siria Estrada RN

## 2022-09-08 NOTE — Clinical Note
[address]  [facility]  [address] [address]  [phone]    Work/School Note    Date: 9/8/2022    To Whom It May concern:    Marcy Hernandez was seen and treated today in the emergency room by the following provider(s):  Attending Provider: Imelda Molina MD.      Marcy Hernandez is excused from work/school on 9/8/2022 through 9/10/2022. She is medically clear to return to work/school on 9/11/2022.          Sincerely,          Jack Swann RN

## 2022-09-08 NOTE — Clinical Note
[address]  [facility]  [address] [id number]  [phone]    Work/School Note    Date: 9/8/2022    To Whom It May concern:    August Lamas was seen and treated today in the emergency room by the following provider(s):  Attending Provider: Ofe Dunham MD.      August Lamas is excused from work/school on 9/8/2022 through 9/10/2022. She is medically clear to return to work/school on 9/11/2022.          Sincerely,          Vickey Coley RN

## 2022-09-09 NOTE — ED PROVIDER NOTES
Pt ambulatory into triage area w/o difficulty. She c/o painful rash that began  afternoon. Rash appears as reddened raised areas with some blister like areas. She states she has a hx of chickenpox as a child but never shingles. Not vaccinated against shingles. 69-year-old female presenting ER with report of painful rash began on . Reports red blisters that have arisen in the area on her right flank back and abdomen. Denies any fevers or chills denies any wounds. Has history of chickenpox in the past but has not had the shingles shot. No headache or eye pain or neck pain or stiffness. Patient denies any other symptoms. Past Medical History:   Diagnosis Date    Fibroid, uterine     History of seasonal allergies        Past Surgical History:   Procedure Laterality Date    HX GYN               Family History:   Problem Relation Age of Onset    Heart Disease Mother     Elevated Lipids Father     Heart Disease Father     Stroke Father        Social History     Socioeconomic History    Marital status:      Spouse name: Not on file    Number of children: Not on file    Years of education: Not on file    Highest education level: Not on file   Occupational History    Not on file   Tobacco Use    Smoking status: Never    Smokeless tobacco: Never   Substance and Sexual Activity    Alcohol use: No    Drug use: No    Sexual activity: Yes   Other Topics Concern    Not on file   Social History Narrative    Not on file     Social Determinants of Health     Financial Resource Strain: Not on file   Food Insecurity: Not on file   Transportation Needs: Not on file   Physical Activity: Not on file   Stress: Not on file   Social Connections: Not on file   Intimate Partner Violence: Not on file   Housing Stability: Not on file         ALLERGIES: Patient has no known allergies. Review of Systems   Skin:  Positive for rash. All other systems reviewed and are negative.     Vitals:    22 2046   Temp: 98.1 °F (36.7 °C)   Weight: 76.5 kg (168 lb 9.6 oz)   Height: 5' 6\" (1.676 m)            Physical Exam  Vitals and nursing note reviewed. HENT:      Head: Normocephalic. Nose: Nose normal.      Mouth/Throat:      Pharynx: Oropharynx is clear. Eyes:      Conjunctiva/sclera: Conjunctivae normal.   Cardiovascular:      Rate and Rhythm: Normal rate. Pulmonary:      Effort: Pulmonary effort is normal. No respiratory distress. Musculoskeletal:         General: Normal range of motion. Cervical back: Neck supple. Skin:     General: Skin is warm. Capillary Refill: Capillary refill takes less than 2 seconds. Findings: Rash present. Rash is vesicular. Comments: Patient has a vesicular rash that follows dermatome distribution on her right flank abdomen and back. Neurological:      General: No focal deficit present. Mental Status: She is alert and oriented to person, place, and time. MDM  Number of Diagnoses or Management Options  Herpes zoster without complication  Diagnosis management comments: Patient with shingles presenting ER due to pain. Discussed diagnosis and treatment plan. Discussed the discharge impression and any labs and the results with the patient. Answered any questions and addressed any concerns. Discussed the importance of following up with their primary care provider and/or specialist.  Discussed signs or symptoms that would warrant return back to the ER for further evaluation. The patient is agreeable with discharge.              Procedures

## 2022-09-09 NOTE — ED TRIAGE NOTES
Pt ambulatory into triage area w/o difficulty. She c/o painful rash that began Sunday afternoon. Rash appears as reddened raised areas with some blister like areas. She states she has a hx of chickenpox as a child but never shingles. Not vaccinated against shingles.

## 2022-10-12 ENCOUNTER — OFFICE VISIT (OUTPATIENT)
Dept: FAMILY MEDICINE CLINIC | Age: 57
End: 2022-10-12
Payer: COMMERCIAL

## 2022-10-12 VITALS
RESPIRATION RATE: 16 BRPM | OXYGEN SATURATION: 98 % | DIASTOLIC BLOOD PRESSURE: 76 MMHG | HEIGHT: 66 IN | BODY MASS INDEX: 27 KG/M2 | HEART RATE: 60 BPM | SYSTOLIC BLOOD PRESSURE: 133 MMHG | TEMPERATURE: 97.9 F | WEIGHT: 168 LBS

## 2022-10-12 DIAGNOSIS — M15.9 PRIMARY OSTEOARTHRITIS INVOLVING MULTIPLE JOINTS: ICD-10-CM

## 2022-10-12 DIAGNOSIS — B02.29 POST HERPETIC NEURALGIA: Primary | ICD-10-CM

## 2022-10-12 PROCEDURE — 99204 OFFICE O/P NEW MOD 45 MIN: CPT | Performed by: FAMILY MEDICINE

## 2022-10-12 RX ORDER — LIDOCAINE 50 MG/G
1 PATCH TOPICAL EVERY 24 HOURS
Qty: 30 PATCH | Refills: 0 | Status: SHIPPED | OUTPATIENT
Start: 2022-10-12

## 2022-10-12 RX ORDER — DICLOFENAC SODIUM 75 MG/1
75 TABLET, DELAYED RELEASE ORAL
Qty: 60 TABLET | Refills: 2 | Status: SHIPPED | OUTPATIENT
Start: 2022-10-12

## 2022-10-12 NOTE — PATIENT INSTRUCTIONS
A Healthy Lifestyle: Care Instructions  Your Care Instructions     A healthy lifestyle can help you feel good, stay at a healthy weight, and have plenty of energy for both work and play. A healthy lifestyle is something you can share with your whole family. A healthy lifestyle also can lower your risk for serious health problems, such as high blood pressure, heart disease, and diabetes. You can follow a few steps listed below to improve your health and the health of your family. Follow-up care is a key part of your treatment and safety. Be sure to make and go to all appointments, and call your doctor if you are having problems. It's also a good idea to know your test results and keep a list of the medicines you take. How can you care for yourself at home? Do not eat too much sugar, fat, or fast foods. You can still have dessert and treats now and then. The goal is moderation. Start small to improve your eating habits. Pay attention to portion sizes, drink less juice and soda pop, and eat more fruits and vegetables. Eat a healthy amount of food. A 3-ounce serving of meat, for example, is about the size of a deck of cards. Fill the rest of your plate with vegetables and whole grains. Limit the amount of soda and sports drinks you have every day. Drink more water when you are thirsty. Eat plenty of fruits and vegetables every day. Have an apple or some carrot sticks as an afternoon snack instead of a candy bar. Try to have fruits and/or vegetables at every meal.  Make exercise part of your daily routine. You may want to start with simple activities, such as walking, bicycling, or slow swimming. Try to be active 30 to 60 minutes every day. You do not need to do all 30 to 60 minutes all at once. For example, you can exercise 3 times a day for 10 or 20 minutes. Moderate exercise is safe for most people, but it is always a good idea to talk to your doctor before starting an exercise program.  Keep moving.  Leny Hughes the lawn, work in the garden, or clean your house. Take the stairs instead of the elevator at work. If you smoke, quit. People who smoke have an increased risk for heart attack, stroke, cancer, and other lung illnesses. Quitting is hard, but there are ways to boost your chance of quitting tobacco for good. Use nicotine gum, patches, or lozenges. Ask your doctor about stop-smoking programs and medicines. Keep trying. In addition to reducing your risk of diseases in the future, you will notice some benefits soon after you stop using tobacco. If you have shortness of breath or asthma symptoms, they will likely get better within a few weeks after you quit. Limit how much alcohol you drink. Moderate amounts of alcohol (up to 2 drinks a day for men, 1 drink a day for women) are okay. But drinking too much can lead to liver problems, high blood pressure, and other health problems. Family health  If you have a family, there are many things you can do together to improve your health. Eat meals together as a family as often as possible. Eat healthy foods. This includes fruits, vegetables, lean meats and dairy, and whole grains. Include your family in your fitness plan. Most people think of activities such as jogging or tennis as the way to fitness, but there are many ways you and your family can be more active. Anything that makes you breathe hard and gets your heart pumping is exercise. Here are some tips:  Walk to do errands or to take your child to school or the bus. Go for a family bike ride after dinner instead of watching TV. Where can you learn more? Go to http://www.gray.com/  Enter R869 in the search box to learn more about \"A Healthy Lifestyle: Care Instructions. \"  Current as of: June 16, 2021               Content Version: 13.2  © 3275-3418 [employer].    Care instructions adapted under license by [employer] (which disclaims liability or warranty for this information). If you have questions about a medical condition or this instruction, always ask your healthcare professional. [place] [address] any warranty or liability for your use of this information.

## 2022-10-12 NOTE — PROGRESS NOTES
Chief Complaint   Patient presents with    New Patient    Establish Care     Patient presents in office today as a NP to establish care. Was seen last month at [facility] ED for shingles. States that she has aches all over. Treating with Tylenol with very little relief. No other concerns.     Learning Assessment 8/19/2016   PRIMARY LEARNER Patient   HIGHEST LEVEL OF EDUCATION - PRIMARY LEARNER  GRADUATED HIGH SCHOOL OR GED   BARRIERS PRIMARY LEARNER NONE   CO-LEARNER CAREGIVER Yes   CO-LEARNER NAME silvia   CO-LEARNER HIGHEST LEVEL OF EDUCATION GRADUATED HIGH SCHOOL OR GED   BARRIERS CO-LEARNER NONE   PRIMARY LANGUAGE [employer]   PRIMARY LANGUAGE CO-LEARNER [employer] [place]    NEED No   LEARNER PREFERENCE PRIMARY READING     VIDEOS   LEARNER PREFERENCE CO-LEARNER LISTENING   ANSWERED BY patient   RELATIONSHIP SELF

## 2022-10-12 NOTE — PROGRESS NOTES
Progress Note    she is a 61y.o. year old female who presents for evalution. Subjective:     Pt was seen at ED for shingles, states it is all healed up but still having some pain like a burning in the area. States she has chronic knee pain b/l worse when she first gets up after sitting for a while. Also aching in her hands when she first gets up. Takes tylenol prn and this does help some. R knee is worse @ times. Reviewed PmHx, RxHx, FmHx, SocHx, AllgHx and updated and dated in the chart. Review of Systems - negative except as listed above in the HPI    Objective:     Vitals:    10/12/22 1050   BP: 133/76   Pulse: 60   Resp: 16   Temp: 97.9 °F (36.6 °C)   TempSrc: Oral   SpO2: 98%   Weight: 168 lb (76.2 kg)   Height: 5' 6\" (1.676 m)       Current Outpatient Medications   Medication Sig    lidocaine (Lidoderm) 5 % 1 Patch by TransDERmal route every twenty-four (24) hours. Apply patch to the affected area for 12 hours a day and remove for 12 hours a day. Indications: pain    diclofenac EC (VOLTAREN) 75 mg EC tablet Take 1 Tablet by mouth two (2) times daily as needed for Pain. diclofenac (VOLTAREN) 1 % gel Apply  to affected area four (4) times daily. diclofenac (VOLTAREN) 1 % gel Apply  to affected area four (4) times daily. (Patient not taking: No sig reported)     No current facility-administered medications for this visit. Physical Examination: General appearance - alert, well appearing, and in no distress  Chest - clear to auscultation, no wheezes, rales or rhonchi, symmetric air entry  Heart - normal rate, regular rhythm, normal S1, S2, no murmurs, rubs, clicks or gallops  Musculoskeletal - no joint tenderness, deformity or swelling  Skin -skin lesions healed    Assessment/ Plan:   Diagnoses and all orders for this visit:    1. Post herpetic neuralgia  -     lidocaine (Lidoderm) 5 %; 1 Patch by TransDERmal route every twenty-four (24) hours.  Apply patch to the affected area for 12 hours a day and remove for 12 hours a day. Indications: pain    2. Primary osteoarthritis involving multiple joints  -     diclofenac EC (VOLTAREN) 75 mg EC tablet; Take 1 Tablet by mouth two (2) times daily as needed for Pain. Given exercises for knee arthritis  Follow-up and Dispositions    Return if symptoms worsen or fail to improve. I have discussed the diagnosis with the patient and the intended plan as seen in the above orders. The patient has received an after-visit summary and questions were answered concerning future plans. Pt conveyed understanding of plan.     Medication Side Effects and Warnings were discussed with patient    An electronic signature was used to authenticate this note  Preeti Jarrell DO

## 2022-11-28 ENCOUNTER — HOSPITAL ENCOUNTER (EMERGENCY)
Age: 57
Discharge: HOME | End: 2022-11-28
Attending: EMERGENCY MEDICINE
Payer: COMMERCIAL

## 2022-11-28 VITALS
OXYGEN SATURATION: 97 % | SYSTOLIC BLOOD PRESSURE: 122 MMHG | HEIGHT: 65 IN | WEIGHT: 170 LBS | RESPIRATION RATE: 18 BRPM | BODY MASS INDEX: 28.32 KG/M2 | HEART RATE: 69 BPM | DIASTOLIC BLOOD PRESSURE: 57 MMHG | TEMPERATURE: 97.9 F

## 2022-11-28 DIAGNOSIS — N30.00 ACUTE CYSTITIS WITHOUT HEMATURIA: Primary | ICD-10-CM

## 2022-11-28 DIAGNOSIS — J06.9 ACUTE UPPER RESPIRATORY INFECTION: ICD-10-CM

## 2022-11-28 LAB
APPEARANCE UR: ABNORMAL
BACTERIA URNS QL MICRO: ABNORMAL /HPF
BILIRUB UR QL: NEGATIVE
COLOR UR: ABNORMAL
EPITH CASTS URNS QL MICRO: ABNORMAL /LPF
GLUCOSE UR STRIP.AUTO-MCNC: NEGATIVE MG/DL
HGB UR QL STRIP: ABNORMAL
KETONES UR QL STRIP.AUTO: NEGATIVE MG/DL
LEUKOCYTE ESTERASE UR QL STRIP.AUTO: ABNORMAL
NITRITE UR QL STRIP.AUTO: NEGATIVE
PH UR STRIP: 6 [PH] (ref 5–8)
PROT UR STRIP-MCNC: NEGATIVE MG/DL
RBC #/AREA URNS HPF: ABNORMAL /HPF
SP GR UR REFRACTOMETRY: 1.01 (ref 1–1.03)
UR CULT HOLD, URHOLD: NORMAL
UROBILINOGEN UR QL STRIP.AUTO: 0.2 EU/DL (ref 0.2–1)
WBC URNS QL MICRO: ABNORMAL /HPF (ref 0–4)

## 2022-11-28 PROCEDURE — 81001 URINALYSIS AUTO W/SCOPE: CPT

## 2022-11-28 PROCEDURE — 87077 CULTURE AEROBIC IDENTIFY: CPT

## 2022-11-28 PROCEDURE — 87086 URINE CULTURE/COLONY COUNT: CPT

## 2022-11-28 PROCEDURE — 87186 SC STD MICRODIL/AGAR DIL: CPT

## 2022-11-28 PROCEDURE — 99283 EMERGENCY DEPT VISIT LOW MDM: CPT

## 2022-11-28 PROCEDURE — 74011250637 HC RX REV CODE- 250/637: Performed by: PHYSICIAN ASSISTANT

## 2022-11-28 RX ORDER — CEPHALEXIN 250 MG/1
500 CAPSULE ORAL
Status: COMPLETED | OUTPATIENT
Start: 2022-11-28 | End: 2022-11-28

## 2022-11-28 RX ORDER — CEPHALEXIN 500 MG/1
500 CAPSULE ORAL 2 TIMES DAILY
Qty: 19 CAPSULE | Refills: 0 | Status: SHIPPED | OUTPATIENT
Start: 2022-11-29 | End: 2022-12-09

## 2022-11-28 RX ORDER — PHENAZOPYRIDINE HYDROCHLORIDE 200 MG/1
200 TABLET, FILM COATED ORAL 3 TIMES DAILY
Qty: 5 TABLET | Refills: 0 | Status: SHIPPED | OUTPATIENT
Start: 2022-11-28 | End: 2022-11-30

## 2022-11-28 RX ORDER — PHENAZOPYRIDINE HYDROCHLORIDE 100 MG/1
200 TABLET, FILM COATED ORAL
Status: COMPLETED | OUTPATIENT
Start: 2022-11-28 | End: 2022-11-28

## 2022-11-28 RX ADMIN — CEPHALEXIN 500 MG: 250 CAPSULE ORAL at 21:27

## 2022-11-28 RX ADMIN — PHENAZOPYRIDINE 200 MG: 100 TABLET ORAL at 21:27

## 2022-11-29 NOTE — ED TRIAGE NOTES
Patient here with concern of urinary pain and back pain since Sunday, reports feeling congestion, sinus pain and headache as well.

## 2022-11-29 NOTE — ED PROVIDER NOTES
60yo female presents with concerns for UTI. She states having dysuria, urinary urgency, frequency x 3 days and dull R lower back pain x today. No hx of kidney stone, hematuria, vaginal d/c or odor, F/C, vomiting, diarrhea, CP, SOB, cough. Also c/o nasal congestion and \"pressure in my sinuses when I lean forward\" x 2 weeks. No fever. Past Medical History:   Diagnosis Date    Fibroid, uterine     History of seasonal allergies        Past Surgical History:   Procedure Laterality Date    HX GYN               Family History:   Problem Relation Age of Onset    Heart Disease Mother     Elevated Lipids Father     Heart Disease Father     Stroke Father        Social History     Socioeconomic History    Marital status:      Spouse name: Not on file    Number of children: Not on file    Years of education: Not on file    Highest education level: Not on file   Occupational History    Not on file   Tobacco Use    Smoking status: Never    Smokeless tobacco: Never   Substance and Sexual Activity    Alcohol use: No    Drug use: No    Sexual activity: Yes   Other Topics Concern    Not on file   Social History Narrative    Not on file     Social Determinants of Health     Financial Resource Strain: Unknown    Difficulty of Paying Living Expenses: Patient refused   Food Insecurity: Unknown    Worried About Running Out of Food in the Last Year: Patient refused    Ran Out of Food in the Last Year: Patient refused   Transportation Needs: Not on file   Physical Activity: Not on file   Stress: Not on file   Social Connections: Not on file   Intimate Partner Violence: Not on file   Housing Stability: Not on file         ALLERGIES: Patient has no known allergies. Review of Systems   Constitutional: Negative. Negative for activity change, chills, fatigue and unexpected weight change. HENT:  Negative for trouble swallowing. Respiratory:  Negative for cough, chest tightness, shortness of breath and wheezing. Cardiovascular: Negative. Negative for chest pain and palpitations. Gastrointestinal: Negative. Negative for abdominal pain, diarrhea, nausea and vomiting. Genitourinary:  Positive for dysuria, frequency and urgency. Negative for hematuria. Musculoskeletal: Negative. Negative for arthralgias, back pain, neck pain and neck stiffness. Skin: Negative. Negative for color change and rash. Neurological: Negative. Negative for dizziness, numbness and headaches. All other systems reviewed and are negative. Vitals:    11/28/22 1946   BP: (!) 142/82   Pulse: 71   Resp: 16   Temp: 98.4 °F (36.9 °C)   SpO2: 98%   Weight: 77.1 kg (170 lb)   Height: 5' 5\" (1.651 m)            Physical Exam  Vitals and nursing note reviewed. Constitutional:       General: She is not in acute distress. Appearance: Normal appearance. She is well-developed. She is not toxic-appearing or diaphoretic. HENT:      Head: Normocephalic and atraumatic. Right Ear: Tympanic membrane normal.      Left Ear: Tympanic membrane normal.      Nose: Nose normal. No congestion or rhinorrhea. Mouth/Throat:      Mouth: Mucous membranes are moist.      Pharynx: No oropharyngeal exudate or posterior oropharyngeal erythema. Eyes:      General:         Right eye: No discharge. Left eye: No discharge. Conjunctiva/sclera: Conjunctivae normal.   Neck:      Trachea: No tracheal tenderness. Cardiovascular:      Rate and Rhythm: Normal rate and regular rhythm. Pulses: Normal pulses. Heart sounds: Normal heart sounds. No murmur heard. No friction rub. No gallop. Pulmonary:      Effort: Pulmonary effort is normal. No respiratory distress. Breath sounds: Normal breath sounds. No wheezing or rales. Chest:      Chest wall: No tenderness. Abdominal:      General: Bowel sounds are normal. There is no distension. Palpations: Abdomen is soft. There is no mass. Tenderness:  There is no abdominal tenderness. There is no right CVA tenderness, left CVA tenderness, guarding or rebound. Hernia: No hernia is present. Musculoskeletal:         General: No tenderness. Normal range of motion. Cervical back: Full passive range of motion without pain and normal range of motion. Skin:     General: Skin is warm and dry. Capillary Refill: Capillary refill takes less than 2 seconds. Findings: No abrasion, erythema or rash. Neurological:      General: No focal deficit present. Mental Status: She is alert and oriented to person, place, and time. Cranial Nerves: No cranial nerve deficit. Sensory: No sensory deficit. Coordination: Coordination normal.   Psychiatric:         Speech: Speech normal.         Behavior: Behavior normal.        MDM  Number of Diagnoses or Management Options  Acute cystitis without hematuria  Acute upper respiratory infection  Diagnosis management comments:   Ddx: UTI, hemorrhagic cystitis, pyelo; sinusitis, URI, viral illness       Amount and/or Complexity of Data Reviewed  Clinical lab tests: ordered and reviewed  Review and summarize past medical records: yes    Patient Progress  Patient progress: stable         Procedures        DISCHARGE NOTE:  9:26 PM  The patient has been re-evaluated and feeling much better and are stable for discharge. All available radiology and laboratory results have been reviewed with patient and/or available family. Patient and/or family verbally conveyed their understanding and agreement of the patient's signs, symptoms, diagnosis, treatment and prognosis and additionally agree to follow-up as recommended in the discharge instructions or to return to the Emergency Department should their condition change or worsen prior to their follow-up appointment. All questions have been answered and patient and/or available family express understanding.       LABORATORY RESULTS:  Recent Results (from the past 24 hour(s))   URINALYSIS W/MICROSCOPIC    Collection Time: 11/28/22  8:44 PM   Result Value Ref Range    Color YELLOW/STRAW      Appearance HAZY (A) CLEAR      Specific gravity 1.015 1.003 - 1.030      pH (UA) 6.0 5.0 - 8.0      Protein Negative NEG mg/dL    Glucose Negative NEG mg/dL    Ketone Negative NEG mg/dL    Bilirubin Negative NEG      Blood MODERATE (A) NEG      Urobilinogen 0.2 0.2 - 1.0 EU/dL    Nitrites Negative NEG      Leukocyte Esterase LARGE (A) NEG      WBC  0 - 4 /hpf    RBC 0-5 /hpf    Epithelial cells FEW FEW /lpf    Bacteria 2+ (A) NEG /hpf   URINE CULTURE HOLD SAMPLE    Collection Time: 11/28/22  8:44 PM    Specimen: Serum; Urine   Result Value Ref Range    Urine culture hold        Urine on hold in Microbiology dept for 2 days. If unpreserved urine is submitted, it cannot be used for addtional testing after 24 hours, recollection will be required. MEDICATIONS GIVEN:  Medications   phenazopyridine (PYRIDIUM) tablet 200 mg (has no administration in time range)   cephALEXin (KEFLEX) capsule 500 mg (has no administration in time range)       IMPRESSION:  1. Acute cystitis without hematuria    2. Acute upper respiratory infection        PLAN:  Follow-up Information       Follow up With Specialties Details Why Contact Info    Danilo Garber, DO Family Medicine Schedule an appointment as soon as possible for a visit  for follow-up after you finish your antibiotic, or sooner if needed. [address]  [address] [facility]  [phone]            Current Discharge Medication List        START taking these medications    Details   cephALEXin (Keflex) 500 mg capsule Take 1 Capsule by mouth two (2) times a day for 10 days. Start on 11/29/22. First dose given on 11/28/22  Qty: 19 Capsule, Refills: 0  Start date: 11/29/2022, End date: 12/9/2022      phenazopyridine (Pyridium) 200 mg tablet Take 1 Tablet by mouth three (3) times daily for 5 doses. First dose given in ER on 11/28/22.   Qty: 5 Tablet, Refills: 0  Start date: 11/28/2022, End date: 11/30/2022

## 2022-11-29 NOTE — ED NOTES
Emergency Department Nursing Plan of Care       The Nursing Plan of Care is developed from the Nursing assessment and Emergency Department Attending provider initial evaluation. The plan of care may be reviewed in the ED Provider note.     The Plan of Care was developed with the following considerations:   Patient / Family readiness to learn indicated by:verbalized understanding  Persons(s) to be included in education: patient  Barriers to Learning/Limitations:No    Signed     Malena De Santiago RN    11/28/2022   8:56 PM

## 2022-12-01 LAB
BACTERIA SPEC CULT: ABNORMAL
CC UR VC: ABNORMAL
SERVICE CMNT-IMP: ABNORMAL

## 2023-03-27 DIAGNOSIS — Z12.31 SCREENING MAMMOGRAM, ENCOUNTER FOR: Primary | ICD-10-CM

## 2023-03-28 ENCOUNTER — OFFICE VISIT (OUTPATIENT)
Dept: PRIMARY CARE CLINIC | Age: 58
End: 2023-03-28
Payer: MEDICAID

## 2023-03-28 VITALS
RESPIRATION RATE: 16 BRPM | HEIGHT: 65 IN | SYSTOLIC BLOOD PRESSURE: 116 MMHG | TEMPERATURE: 98.3 F | WEIGHT: 172 LBS | DIASTOLIC BLOOD PRESSURE: 75 MMHG | OXYGEN SATURATION: 98 % | BODY MASS INDEX: 28.66 KG/M2 | HEART RATE: 68 BPM

## 2023-03-28 DIAGNOSIS — Z12.12 ENCOUNTER FOR COLORECTAL CANCER SCREENING: ICD-10-CM

## 2023-03-28 DIAGNOSIS — K21.9 GASTROESOPHAGEAL REFLUX DISEASE, UNSPECIFIED WHETHER ESOPHAGITIS PRESENT: ICD-10-CM

## 2023-03-28 DIAGNOSIS — Z13.29 THYROID DISORDER SCREENING: ICD-10-CM

## 2023-03-28 DIAGNOSIS — Z12.11 ENCOUNTER FOR COLORECTAL CANCER SCREENING: ICD-10-CM

## 2023-03-28 DIAGNOSIS — E78.2 MIXED HYPERLIPIDEMIA: ICD-10-CM

## 2023-03-28 DIAGNOSIS — J30.89 ENVIRONMENTAL AND SEASONAL ALLERGIES: ICD-10-CM

## 2023-03-28 DIAGNOSIS — R20.2 TINGLING SENSATION: ICD-10-CM

## 2023-03-28 DIAGNOSIS — Z76.89 ENCOUNTER TO ESTABLISH CARE: Primary | ICD-10-CM

## 2023-03-28 PROCEDURE — 99204 OFFICE O/P NEW MOD 45 MIN: CPT | Performed by: FAMILY MEDICINE

## 2023-03-28 RX ORDER — FLUTICASONE PROPIONATE 50 MCG
2 SPRAY, SUSPENSION (ML) NASAL
Qty: 1 EACH | Refills: 2 | Status: SHIPPED | OUTPATIENT
Start: 2023-03-28

## 2023-03-28 RX ORDER — LEVOCETIRIZINE DIHYDROCHLORIDE 5 MG/1
5 TABLET, FILM COATED ORAL
Qty: 30 TABLET | Refills: 2 | Status: SHIPPED | OUTPATIENT
Start: 2023-03-28

## 2023-03-28 RX ORDER — PANTOPRAZOLE SODIUM 20 MG/1
20 TABLET, DELAYED RELEASE ORAL DAILY
Qty: 30 TABLET | Refills: 2 | Status: SHIPPED | OUTPATIENT
Start: 2023-03-28

## 2023-03-28 NOTE — PROGRESS NOTES
Chief Complaint   Patient presents with    Establish Care     Visit Vitals  /75 (BP 1 Location: Right arm, BP Patient Position: Sitting)   Pulse 68   Temp 98.3 °F (36.8 °C) (Oral)   Resp 16   Ht 5' 5\" (1.651 m)   Wt 172 lb (78 kg)   SpO2 98%   BMI 28.62 kg/m²     1. \"Have you been to the ER, urgent care clinic since your last visit? Hospitalized since your last visit? \" No    2. \"Have you seen or consulted any other health care providers outside of the [address] [place] [address] since your last visit? \" No     3. For patients aged 39-70: Has the patient had a colonoscopy / FIT/ Cologuard? No      If the patient is female:    4. For patients aged 41-77: Has the patient had a mammogram within the past 2 years? Yes - no Care Gap present      5. For patients aged 21-65: Has the patient had a pap smear?  Yes - no Care Gap present

## 2023-03-28 NOTE — PROGRESS NOTES
Madina Sullivan (: 1965) is a 61 y.o. female, established patient, here for evaluation of the following chief complaint(s):  Establish Care (Problems with allergies and GERD)       ASSESSMENT/PLAN:  Below is the assessment and plan developed based on review of pertinent history, physical exam, labs, studies, and medications. 1. Encounter to establish care  New patient. Pantoprazole 20 mg as needed for GERD. Levocetirizine 5 mg daily and Flonase daily as needed for allergies. Neti pot daily with saline nasal rinse. Labs ordered. Etiology for tingling unclear. Doubt due to shingles as occurring over entirety of body and doubt correlated with vein ablation she had last year. Metabolic (diabetes?) versus endocrine (thyroid dysfunction) versus psychogenic (patient worried about health). Suggest multivitamins to replenish essential vitamins and minerals. GI referral for colorectal cancer screening. 2. Gastroesophageal reflux disease, unspecified whether esophagitis present  -     METABOLIC PANEL, COMPREHENSIVE  -     CBC W/O DIFF  -     pantoprazole (PROTONIX) 20 mg tablet; Take 1 Tablet by mouth daily. , Normal, Disp-30 Tablet, R-2  3. Environmental and seasonal allergies  -     levocetirizine (XYZAL) 5 mg tablet; Take 1 Tablet by mouth nightly as needed for Allergies or Itching., Normal, Disp-30 Tablet, R-2  -     fluticasone propionate (FLONASE) 50 mcg/actuation nasal spray; 2 Sprays by Both Nostrils route daily as needed for Rhinitis or Allergies. , Normal, Disp-1 Each, R-2  4. Mixed hyperlipidemia  -     LIPID PANEL  5. Tingling sensation  6. Encounter for colorectal cancer screening  -     REFERRAL TO GASTROENTEROLOGY  7. Thyroid disorder screening  -     TSH RFX ON ABNORMAL TO FREE T4      Return in about 3 months (around 2023) for chronic care follow up.       SUBJECTIVE/OBJECTIVE:  HPI    59-year-old female past medical history GERD, allergies, hyperlipidemia seen in office today to establish care with new provider. Patient reports a burning sensation in the epigastrium that travels up to her oropharynx. Not new, patient has symptoms intermittently for several years. Past doctors have prescribed treatment possibly PPI which were effective in reducing symptomatology. Heartburn is usually triggered with coffee which patient drinks daily. Patient states her other food triggers as well such as spicy foods. No recent weight loss reported. No malaise or night sweats. Patient reports allergies nonresponsive to over-the-counter antihistamine. Patient complains of nasal congestion, sneezing, and coughing. Patient reports red bumps on her arms and chest that are pruritic. Patient reports a tingling sensation over her entire body, not just her extremities. She had vein ablation several years back and patient believes that the procedure caused damage to her veins and nerves. She had a shingles outbreak last year that was just under her right breast and to the right mid back. Patient states the tingling sensation is over her entire body, last a few seconds to minutes, dissipates with repetitive shaking. Patient states it happens only at bedtime. No Known Allergies  Current Outpatient Medications   Medication Sig    pantoprazole (PROTONIX) 20 mg tablet Take 1 Tablet by mouth daily. levocetirizine (XYZAL) 5 mg tablet Take 1 Tablet by mouth nightly as needed for Allergies or Itching. fluticasone propionate (FLONASE) 50 mcg/actuation nasal spray 2 Sprays by Both Nostrils route daily as needed for Rhinitis or Allergies. No current facility-administered medications for this visit.      Past Medical History:   Diagnosis Date    Fibroid, uterine     History of seasonal allergies      Past Surgical History:   Procedure Laterality Date    HX GYN           Family History   Problem Relation Age of Onset    Heart Disease Mother     Elevated Lipids Father     Heart Disease Father Stroke Father      Social History     Tobacco Use   Smoking Status Never   Smokeless Tobacco Never         Review of Systems   All other systems reviewed and are negative. /75 (BP 1 Location: Right arm, BP Patient Position: Sitting)   Pulse 68   Temp 98.3 °F (36.8 °C) (Oral)   Resp 16   Ht 5' 5\" (1.651 m)   Wt 172 lb (78 kg)   SpO2 98%   BMI 28.62 kg/m²    Physical Exam  Vitals reviewed. HENT:      Head: Normocephalic and atraumatic. Eyes:      Conjunctiva/sclera: Conjunctivae normal.   Cardiovascular:      Rate and Rhythm: Normal rate and regular rhythm. Pulses: Normal pulses. Heart sounds: Normal heart sounds. Pulmonary:      Effort: Pulmonary effort is normal.      Breath sounds: Normal breath sounds. Abdominal:      General: Bowel sounds are normal.   Musculoskeletal:         General: Normal range of motion. Cervical back: Neck supple. Skin:     General: Skin is warm. Capillary Refill: Capillary refill takes less than 2 seconds. Neurological:      Mental Status: She is alert. Cranial Nerves: No cranial nerve deficit. Sensory: No sensory deficit. Motor: No weakness. Coordination: Coordination normal.      Gait: Gait normal.      Deep Tendon Reflexes: Reflexes normal.   Psychiatric:         Mood and Affect: Mood normal.             An electronic signature was used to authenticate this note.   -- Erin Shelley MD   [facility] [address]

## 2023-03-30 LAB
ALBUMIN SERPL-MCNC: 4.6 G/DL (ref 3.8–4.9)
ALBUMIN/GLOB SERPL: 1.5 {RATIO} (ref 1.2–2.2)
ALP SERPL-CCNC: 100 IU/L (ref 44–121)
ALT SERPL-CCNC: 16 IU/L (ref 0–32)
AST SERPL-CCNC: 20 IU/L (ref 0–40)
BILIRUB SERPL-MCNC: 0.3 MG/DL (ref 0–1.2)
BUN SERPL-MCNC: 12 MG/DL (ref 6–24)
BUN/CREAT SERPL: 19 (ref 9–23)
CALCIUM SERPL-MCNC: 9.8 MG/DL (ref 8.7–10.2)
CHLORIDE SERPL-SCNC: 102 MMOL/L (ref 96–106)
CHOLEST SERPL-MCNC: 238 MG/DL (ref 100–199)
CO2 SERPL-SCNC: 23 MMOL/L (ref 20–29)
CREAT SERPL-MCNC: 0.63 MG/DL (ref 0.57–1)
EGFRCR SERPLBLD CKD-EPI 2021: 103 ML/MIN/1.73
ERYTHROCYTE [DISTWIDTH] IN BLOOD BY AUTOMATED COUNT: 12.8 % (ref 11.7–15.4)
GLOBULIN SER CALC-MCNC: 3 G/DL (ref 1.5–4.5)
GLUCOSE SERPL-MCNC: 91 MG/DL (ref 70–99)
HCT VFR BLD AUTO: 35.2 % (ref 34–46.6)
HDLC SERPL-MCNC: 44 MG/DL
HGB BLD-MCNC: 12.4 G/DL (ref 11.1–15.9)
LDLC SERPL CALC-MCNC: 153 MG/DL (ref 0–99)
MCH RBC QN AUTO: 31.3 PG (ref 26.6–33)
MCHC RBC AUTO-ENTMCNC: 35.2 G/DL (ref 31.5–35.7)
MCV RBC AUTO: 89 FL (ref 79–97)
PLATELET # BLD AUTO: 219 X10E3/UL (ref 150–450)
POTASSIUM SERPL-SCNC: 4 MMOL/L (ref 3.5–5.2)
PROT SERPL-MCNC: 7.6 G/DL (ref 6–8.5)
RBC # BLD AUTO: 3.96 X10E6/UL (ref 3.77–5.28)
SODIUM SERPL-SCNC: 138 MMOL/L (ref 134–144)
TRIGL SERPL-MCNC: 222 MG/DL (ref 0–149)
TSH SERPL DL<=0.005 MIU/L-ACNC: 2.13 UIU/ML (ref 0.45–4.5)
VLDLC SERPL CALC-MCNC: 41 MG/DL (ref 5–40)
WBC # BLD AUTO: 5 X10E3/UL (ref 3.4–10.8)

## 2023-04-12 ENCOUNTER — HOSPITAL ENCOUNTER (OUTPATIENT)
Dept: MAMMOGRAPHY | Age: 58
Discharge: HOME | End: 2023-04-12
Attending: OBSTETRICS & GYNECOLOGY
Payer: MEDICAID

## 2023-04-12 DIAGNOSIS — Z12.31 SCREENING MAMMOGRAM, ENCOUNTER FOR: ICD-10-CM

## 2023-04-12 PROCEDURE — 77067 SCR MAMMO BI INCL CAD: CPT

## 2023-04-23 DIAGNOSIS — Z12.31 SCREENING MAMMOGRAM, ENCOUNTER FOR: Primary | ICD-10-CM

## 2023-04-23 NOTE — Clinical Note
[address]  [facility] EMERGENCY DEPT  [address] [phone]  [phone]    Work/School Note    Date: 9/8/2022    To Whom It May concern:    Daljit Cheung was seen and treated today in the emergency room by the following provider(s):  Attending Provider: Viktoria Chacon MD.      Daljit Cheung is excused from work/school on 9/8/2022 through 9/10/2022. She is medically clear to return to work/school on 9/11/2022.          Sincerely,          Karla Jackson MD EmPATH Unit - Psychiatric Consultation  [facility] Emergency Department    Meredith Figueroa MRN: [id number]   Age: 28 year old YOB: 1995     History     Chief Complaint   Patient presents with     Suicidal     Per Miguel Doyle MD, 4/21/23:  HPI  Meredith Figueroa is a 28 year old female with a past history notable for major depressive disorder, borderline personality disorder, and an unspecified eating disorder who presents to the emergency department reporting worsening depressed mood and suicidal thoughts.  She was determined to be medically stable and transferred to the EmPATH unit for psychiatric assessment.  She has been in the emergency department approximately 22 hours.  On examination today, she interprets that her medications are no longer effective at treating her depressive symptoms.  She interprets taking high doses of medications and having tried numerous past medications without benefit.  The most effective medication she has tried was reported to be Wellbutrin and mirtazapine.  Although she continues to take Wellbutrin, mirtazapine was discontinued almost 1 year ago.  She was able to identify a gradual decompensation since then despite attempts to optimize her other medications.  She discussed frustrations with pursuing mental health treatments in the community, reporting a recent visit to the [facility] however was disappointed to hear that there is a long wait time for admission.  She recalls that medication changes were discussed however not made for her.  She returned back home with a sense of helplessness.  She attempted to utilize her boyfriend for additional support in hopes that this symptom intensity would subside however it has not.  She had a outpatient psychiatric visit yesterday, after which, she decided to come to the emergency department for additional help.  Today, she continues to endorse suicidal thoughts although she feels safe on the unit.  There is no indication of psychosis  or homicidal thoughts.  She is seeking medication changes today in hopes of addressing her depressive symptoms.     Of note, the patient recalls obtaining GeneSight testing in the past which noted her to be a slow metabolizer of many medications, including Wellbutrin.    Today, 4/22/23:  Patient reports doing relatively the same today.  Patient tolerating recent medication changes well.  Lamotrigine has been decreased to 75 mg twice daily and Latuda initiated at 20 mg.  Patient also reports sleeping better since starting mirtazapine 15 mg at bedtime.  Patient wondering about an as needed medication.  Has anxiety around food/meal time.  He is to take hydroxyzine and found it helpful.  Had her providers in the past to get away from her since she was considering using that medication for overdose.  Patient agreeable to using the medication again while in the hospital and we have control of monitoring the medication.  Patient could consider safety planning with medication if she would like to discharge with the medication.  Patient reports not knowing if she is safe to discharge at this time.  She does not think she is safe to discharge and reports her boyfriend would not want her home yet anyways.  Patient also admits she would tell us what we need to hear in order to discharge.  Patient agreeable to staying for ongoing stabilization and ongoing medication changes.  Denies acute safety concerns on the unit.    Past Medical History  Past Medical History:   Diagnosis Date     Anxiety      Chronic mental illness      Depressive disorder      Eating disorder, unspecified      PAVITHRA (generalised anxiety disorder)      Uncomplicated asthma      No past surgical history on file.  albuterol (PROAIR HFA/PROVENTIL HFA/VENTOLIN HFA) 108 (90 Base) MCG/ACT inhaler  beclomethasone HFA (QVAR REDIHALER) 40 MCG/ACT inhaler  buPROPion (WELLBUTRIN XL) 300 MG 24 hr tablet  desvenlafaxine (PRISTIQ) 100 MG 24 hr tablet  ibuprofen  "(ADVIL/MOTRIN) 200 MG tablet  lamoTRIgine (LAMICTAL) 100 MG tablet  loratadine (CLARITIN) 10 MG tablet  omeprazole (PRILOSEC) 20 MG DR capsule  traZODone (DESYREL) 150 MG tablet      Allergies   Allergen Reactions     Dairy [Milk Products]      \"my eczema flares up\"     Seasonal Allergies      Sulfa Drugs      Family History  Family History   Problem Relation Age of Onset     Depression Mother      Depression Father      Anxiety Disorder Father      Unknown/Adopted Maternal Grandmother      Unknown/Adopted Maternal Grandfather      Unknown/Adopted Paternal Grandmother      Depression Paternal Grandfather      Anxiety Disorder Paternal Grandfather      Dementia Paternal Grandfather      Parkinsonism Paternal Grandfather      Depression Brother      Anxiety Disorder Brother      Autism Spectrum Disorder Brother      Anxiety Disorder Brother      Mental Illness Brother      Schizophrenia No family hx of      Bipolar Disorder No family hx of      Suicide No family hx of      Substance Abuse No family hx of      Marcelo Disease No family hx of      Intellectual Disability (Mental Retardation) No family hx of      Social History   Social History     Tobacco Use     Smoking status: Some Days     Smokeless tobacco: Never   Substance Use Topics     Alcohol use: No     Drug use: No          Review of Systems  A medically appropriate review of systems was performed with pertinent positives and negatives noted in the HPI, and all other systems negative.    Physical Examination   BP: 122/64  Pulse: 86  Temp: 98  F (36.7  C)  Resp: 16  Height: 160 cm (5' 3\")  Weight: 63.5 kg (140 lb)  SpO2: 99 %    Physical Exam  General: Appears stated age.   Neuro: Alert and fully oriented. Extremities appear to demonstrate normal strength on visual inspection.   Integumentary/Skin: no rash visualized, normal color    Psychiatric Examination   Appearance: awake, alert, adequately groomed and appeared as age stated  Attitude:  " cooperative  Eye Contact:  good  Mood:  up and down  Affect:  mood congruent  Speech:  clear, coherent  Psychomotor Behavior:  no evidence of tardive dyskinesia, dystonia, or tics  Thought Process:  logical, linear and goal oriented  Associations:  no loose associations  Thought Content:  no evidence of psychotic thought, passive suicidal ideation present, no auditory hallucinations present and no visual hallucinations present  Insight:  fair  Judgement:  fair  Oriented to:  time, person, and place  Attention Span and Concentration:  intact  Recent and Remote Memory:  intact  Language: able to name/identify objects without impairment  Fund of Knowledge: intact with awareness of current and past events    ED Course        Labs Ordered and Resulted from Time of ED Arrival to Time of ED Departure   DRUG ABUSE SCREEN 77 URINE (FL, RH, SH) - Abnormal       Result Value    Amphetamines Urine Screen Positive (*)     Barbituates Urine Screen Negative      Benzodiazepine Urine Screen Negative      Cannabinoids Urine Screen Positive (*)     Opiates Urine Screen Negative      PCP Urine Screen Positive (*)     Cocaine Urine Screen Negative     COVID-19 VIRUS (CORONAVIRUS) BY PCR - Normal    SARS CoV2 PCR Negative     HCG QUALITATIVE URINE - Normal    hCG Urine Qualitative Negative         Assessments & Plan (with Medical Decision Making)   Patient presenting with borderline personality disorder, worsening depression and suicidal ideation despite ongoing outpatient psychiatric care and adherence to treatment plan.  At intake, patient voluntary for inpatient psychiatric admission.   And if she focused medication changes to address affect of instability and depressed mood- Discontinuing lamotrigine since not helpful and started Latuda for mood stabilization and to augment antidepressants.  Patient tolerating changes well and continues to have notable safety concerns.  Nursing notes reviewed noting no acute issues.     I have  "reviewed the assessment completed by the [facility].     Preliminary diagnosis:    ICD-10-CM    1. Suicidal ideation  R45.851       2. MDD (major depressive disorder), recurrent severe, without psychosis (H)  F33.2       3. Borderline personality disorder (H)  F60.3       4. Eating disorder, unspecified type  F50.9            Treatment Plan:  -Continue plan for inpatient psychiatric admission: Voluntary status  -Continue taper off of lamotrigine: Decreased to 75 mg daily  -Continue Latuda 20 mg with supper for mood stabilization associated with borderline personality disorder.  -Continue mirtazapine 15 mg nightly for sleep  -Continue Pristiq 100 mg daily for MDD.  -Continue Wellbutrin 300 mg daily for MDD.  -Hydroxyzine 25-50 mg started as needed for anxiety around mealtime in particular.  -Consider EKG prior to discharge given the number of potential QTc prolonging medications.  -Patient denies true dairy/milk allergy and so this was removed from electronic healthcare record allergy list since patient quite upset/frustrated she cannot order dairy items while in the emergency room.  -Medication education provided this visit including but not limited to: Rationale for medication, importance of medication adherence, medication interactions, common medication side effects, benefits of medications.  -Problem focused supportive therapy and education provided today related to patient's current and acute stressors, symptoms, and diagnoses.  -Discussed consideration of radically open DBT  -Continue to reassess while waiting for inpatient psychiatry bed    --  Sherrill Ryan DO   [facility] EMERGENCY DEPT  EmPATH Unit  4/22/2023     This note was created with voice recognition software. Inadvertent grammatical errors, typographical errors, and \"sound-a-like\" substitutions may occur due to limitations of the software.  Read the note carefully and apply context when erroneous substitutions have occurred. Thank you. "        Sherrill Ryan,   04/22/23 2041

## 2023-07-01 RX ORDER — PANTOPRAZOLE SODIUM 20 MG/1
TABLET, DELAYED RELEASE ORAL
Qty: 30 TABLET | Refills: 1 | Status: SHIPPED | OUTPATIENT
Start: 2023-07-01 | End: 2023-07-26 | Stop reason: SDUPTHER

## 2023-07-01 RX ORDER — LEVOCETIRIZINE DIHYDROCHLORIDE 5 MG/1
TABLET, FILM COATED ORAL
Qty: 30 TABLET | Refills: 1 | Status: SHIPPED | OUTPATIENT
Start: 2023-07-01 | End: 2023-07-26 | Stop reason: SDUPTHER

## 2023-07-26 ENCOUNTER — OFFICE VISIT (OUTPATIENT)
Dept: PRIMARY CARE CLINIC | Facility: CLINIC | Age: 58
End: 2023-07-26
Payer: MEDICAID

## 2023-07-26 VITALS
HEIGHT: 65 IN | TEMPERATURE: 98.2 F | SYSTOLIC BLOOD PRESSURE: 124 MMHG | BODY MASS INDEX: 28.56 KG/M2 | HEART RATE: 65 BPM | DIASTOLIC BLOOD PRESSURE: 73 MMHG | WEIGHT: 171.4 LBS | OXYGEN SATURATION: 98 % | RESPIRATION RATE: 16 BRPM

## 2023-07-26 DIAGNOSIS — J30.89 ENVIRONMENTAL AND SEASONAL ALLERGIES: ICD-10-CM

## 2023-07-26 DIAGNOSIS — F41.9 MILD ANXIETY: ICD-10-CM

## 2023-07-26 DIAGNOSIS — Z12.11 ENCOUNTER FOR COLORECTAL CANCER SCREENING: ICD-10-CM

## 2023-07-26 DIAGNOSIS — Z12.12 ENCOUNTER FOR COLORECTAL CANCER SCREENING: ICD-10-CM

## 2023-07-26 DIAGNOSIS — K21.9 GASTROESOPHAGEAL REFLUX DISEASE, UNSPECIFIED WHETHER ESOPHAGITIS PRESENT: Primary | ICD-10-CM

## 2023-07-26 DIAGNOSIS — E78.2 MIXED HYPERLIPIDEMIA: ICD-10-CM

## 2023-07-26 PROCEDURE — 1036F TOBACCO NON-USER: CPT | Performed by: FAMILY MEDICINE

## 2023-07-26 PROCEDURE — G8419 CALC BMI OUT NRM PARAM NOF/U: HCPCS | Performed by: FAMILY MEDICINE

## 2023-07-26 PROCEDURE — G8427 DOCREV CUR MEDS BY ELIG CLIN: HCPCS | Performed by: FAMILY MEDICINE

## 2023-07-26 PROCEDURE — 99214 OFFICE O/P EST MOD 30 MIN: CPT | Performed by: FAMILY MEDICINE

## 2023-07-26 PROCEDURE — 3017F COLORECTAL CA SCREEN DOC REV: CPT | Performed by: FAMILY MEDICINE

## 2023-07-26 RX ORDER — LEVOCETIRIZINE DIHYDROCHLORIDE 5 MG/1
TABLET, FILM COATED ORAL
Qty: 30 TABLET | Refills: 3 | Status: SHIPPED | OUTPATIENT
Start: 2023-07-26

## 2023-07-26 RX ORDER — PANTOPRAZOLE SODIUM 20 MG/1
20 TABLET, DELAYED RELEASE ORAL DAILY
Qty: 30 TABLET | Refills: 3 | Status: SHIPPED | OUTPATIENT
Start: 2023-07-26

## 2023-07-26 RX ORDER — BUSPIRONE HYDROCHLORIDE 5 MG/1
5 TABLET ORAL 3 TIMES DAILY
Qty: 90 TABLET | Refills: 2 | Status: SHIPPED | OUTPATIENT
Start: 2023-07-26 | End: 2023-10-24

## 2023-07-26 RX ORDER — FLUTICASONE PROPIONATE 50 MCG
1 SPRAY, SUSPENSION (ML) NASAL DAILY
Qty: 16 G | Refills: 2 | Status: SHIPPED | OUTPATIENT
Start: 2023-07-26

## 2023-07-26 SDOH — ECONOMIC STABILITY: FOOD INSECURITY: WITHIN THE PAST 12 MONTHS, YOU WORRIED THAT YOUR FOOD WOULD RUN OUT BEFORE YOU GOT MONEY TO BUY MORE.: NEVER TRUE

## 2023-07-26 SDOH — ECONOMIC STABILITY: HOUSING INSECURITY
IN THE LAST 12 MONTHS, WAS THERE A TIME WHEN YOU DID NOT HAVE A STEADY PLACE TO SLEEP OR SLEPT IN A SHELTER (INCLUDING NOW)?: NO

## 2023-07-26 SDOH — ECONOMIC STABILITY: INCOME INSECURITY: HOW HARD IS IT FOR YOU TO PAY FOR THE VERY BASICS LIKE FOOD, HOUSING, MEDICAL CARE, AND HEATING?: NOT HARD AT ALL

## 2023-07-26 SDOH — ECONOMIC STABILITY: FOOD INSECURITY: WITHIN THE PAST 12 MONTHS, THE FOOD YOU BOUGHT JUST DIDN'T LAST AND YOU DIDN'T HAVE MONEY TO GET MORE.: NEVER TRUE

## 2023-07-26 NOTE — PROGRESS NOTES
Mallorie Anton (: 1965) is a 61 y.o. female, established patient, here for evaluation of the following chief complaint(s):  Follow-up (Cold X 3 weeks/ medication)       ASSESSMENT/PLAN:  Below is the assessment and plan developed based on review of pertinent history, physical exam, labs, studies, and medications. 1. Gastroesophageal reflux disease, unspecified whether esophagitis present  Chronic  -     pantoprazole (PROTONIX) 20 MG tablet; Take 1 tablet by mouth daily, Disp-30 tablet, R-3Normal  Pantoprazole 20 mg refilled, avoid GERD triggers. 2. Mixed hyperlipidemia  Chronic  Uncontrolled, patient will work on improving her diet to lower cholesterol levels. 3. Mild anxiety  New  -     busPIRone (BUSPAR) 5 MG tablet; Take 1 tablet by mouth 3 times daily, Disp-90 tablet, R-2Normal  Start BuSpar for anxiety. 4. Environmental and seasonal allergies  Chronic  -     levocetirizine (XYZAL) 5 MG tablet; TAKE ONE TABLET BY MOUTH EVERY NIGHT AT BEDTIME AS NEEDED FOR ALLERGIES OR ITCHING, Disp-30 tablet, R-3Normal  -     fluticasone (FLONASE) 50 MCG/ACT nasal spray; 1 spray by Each Nostril route daily, Disp-16 g, R-2Normal  Continue levocetirizine and start Flonase. If no improvement in 1 week, call office. Consider antibiotics. 5. Encounter for colorectal cancer screening  -     Cologuard (Fecal DNA Colorectal Cancer Screening)      Return in about 1 month (around 2023) for medicare wellness. SUBJECTIVE/OBJECTIVE:  HPI    50-year-old female past medical history GERD, hyperlipidemia, seasonal allergies seen in office today for chronic care follow-up. Patient was seen as a new patient in 2023 at which time she complained of a burning sensation in her epigastrium that traveled up to her pharynx. She has been having the symptoms intermittently for several years. She was prescribed a PPI. The patient also reported a tingling sensation throughout her body.   Lab work-up did not reveal a

## 2023-07-26 NOTE — PROGRESS NOTES
Identified Patient with two Patient identifiers(name and ). 1. Have you been to the ER, urgent care clinic since your last visit? Hospitalized since your last visit? No    2. Have you seen or consulted any other health care providers outside of the [address] [place] [address] since your last visit? No     3. For patients aged 43-73: Has the patient had a colonoscopy / FIT/ Cologuard? No    If the patient is female:    4. For patients aged 43-66: Has the patient had a mammogram within the past 2 years? No      5. For patients aged 21-65: Has the patient had a pap smear? No   There were no vitals taken for this visit. Chief Complaint   Patient presents with    Follow-up     Cold X 3 weeks/ medication       Health Maintenance Due   Topic Date Due    HIV screen  Never done    DTaP/Tdap/Td vaccine (1 - Tdap) Never done    Colorectal Cancer Screen  Never done    Shingles vaccine (1 of 2) Never done    COVID-19 Vaccine (4 - Booster for [employer] series) 2021    Annual Wellness Visit (AWV)  Never done          No questionnaires available.

## 2023-08-08 ENCOUNTER — OFFICE VISIT (OUTPATIENT)
Age: 58
End: 2023-08-08

## 2023-08-08 VITALS
OXYGEN SATURATION: 93 % | SYSTOLIC BLOOD PRESSURE: 108 MMHG | DIASTOLIC BLOOD PRESSURE: 57 MMHG | HEIGHT: 65 IN | WEIGHT: 173 LBS | BODY MASS INDEX: 28.82 KG/M2 | HEART RATE: 60 BPM | RESPIRATION RATE: 18 BRPM

## 2023-08-08 DIAGNOSIS — N64.4 MASTODYNIA: ICD-10-CM

## 2023-08-08 DIAGNOSIS — Z01.419 GYNECOLOGIC EXAM NORMAL: Primary | ICD-10-CM

## 2023-08-08 DIAGNOSIS — Z12.39 SCREENING BREAST EXAMINATION: ICD-10-CM

## 2023-08-08 DIAGNOSIS — Z12.4 ENCOUNTER FOR SCREENING FOR MALIGNANT NEOPLASM OF CERVIX: ICD-10-CM

## 2023-08-08 DIAGNOSIS — N39.0 URINARY TRACT INFECTION WITHOUT HEMATURIA, SITE UNSPECIFIED: ICD-10-CM

## 2023-08-08 LAB
BILIRUBIN, URINE, POC: NEGATIVE
BLOOD URINE, POC: ABNORMAL
GLUCOSE URINE, POC: NEGATIVE
KETONES, URINE, POC: NEGATIVE
LEUKOCYTE ESTERASE, URINE, POC: ABNORMAL
NITRITE, URINE, POC: POSITIVE
PH, URINE, POC: 6 (ref 4.6–8)
PROTEIN,URINE, POC: ABNORMAL
SPECIFIC GRAVITY, URINE, POC: 1.02 (ref 1–1.03)
URINALYSIS CLARITY, POC: ABNORMAL
URINALYSIS COLOR, POC: YELLOW
UROBILINOGEN, POC: ABNORMAL

## 2023-08-08 RX ORDER — FLUCONAZOLE 150 MG/1
150 TABLET ORAL ONCE
Qty: 1 TABLET | Refills: 0 | Status: SHIPPED | OUTPATIENT
Start: 2023-08-08 | End: 2023-08-08

## 2023-08-08 RX ORDER — ROSUVASTATIN CALCIUM 5 MG/1
TABLET, COATED ORAL
COMMUNITY
Start: 2023-06-28

## 2023-08-08 RX ORDER — CIPROFLOXACIN 500 MG/1
500 TABLET, FILM COATED ORAL 2 TIMES DAILY
Qty: 10 TABLET | Refills: 0 | Status: SHIPPED | OUTPATIENT
Start: 2023-08-08 | End: 2023-08-13

## 2023-08-10 LAB — NONINV COLON CA DNA+OCC BLD SCRN STL QL: NEGATIVE

## 2023-08-12 LAB
CYTOLOGIST CVX/VAG CYTO: ABNORMAL
CYTOLOGY CVX/VAG DOC CYTO: ABNORMAL
CYTOLOGY CVX/VAG DOC THIN PREP: ABNORMAL
DX ICD CODE: ABNORMAL
HPV I/H RISK 4 DNA CVX QL PROBE+SIG AMP: POSITIVE
HPV16 DNA CVX QL PROBE+SIG AMP: NEGATIVE
HPV18+45 E6+E7 MRNA CVX QL NAA+PROBE: NEGATIVE
Lab: ABNORMAL
OTHER STN SPEC: ABNORMAL
STAT OF ADQ CVX/VAG CYTO-IMP: ABNORMAL

## 2023-08-14 ENCOUNTER — TELEPHONE (OUTPATIENT)
Dept: PRIMARY CARE CLINIC | Facility: CLINIC | Age: 58
End: 2023-08-14

## 2023-08-14 NOTE — TELEPHONE ENCOUNTER
Called pt to advise of lab results and recommendations. Pt advised to f/u every 3 years. Pt is also stating she is still not feeling well, still has cold and tried to call Friday. She was inquiring about an antibiotic she requested however, she was seen by GYN on 8/8/23 and an antibiotic was prescribed but she never picked it up from the pharmacy because it wasn't there she said. Advised pt to call the GYN office back to get the appropriate antibiotic for her UTI and if symptoms persists afterwards, call our office back.

## 2023-08-14 NOTE — TELEPHONE ENCOUNTER
----- Message from Nayana Duran MD sent at 8/14/2023  7:48 AM EDT -----  Please inform patient that her Cologuard for colon cancer screening was negative. I do recommend that she continue to have them every 3 years for screening.

## 2023-08-28 RX ORDER — FLUTICASONE PROPIONATE 50 MCG
2 SPRAY, SUSPENSION (ML) NASAL DAILY PRN
COMMUNITY
Start: 2023-03-28 | End: 2023-08-29

## 2023-08-29 ENCOUNTER — OFFICE VISIT (OUTPATIENT)
Dept: PRIMARY CARE CLINIC | Facility: CLINIC | Age: 58
End: 2023-08-29
Payer: MEDICAID

## 2023-08-29 VITALS
HEIGHT: 67 IN | WEIGHT: 172 LBS | TEMPERATURE: 98.4 F | DIASTOLIC BLOOD PRESSURE: 73 MMHG | SYSTOLIC BLOOD PRESSURE: 134 MMHG | OXYGEN SATURATION: 98 % | HEART RATE: 61 BPM | BODY MASS INDEX: 27 KG/M2 | RESPIRATION RATE: 16 BRPM

## 2023-08-29 DIAGNOSIS — F41.9 MILD ANXIETY: Primary | ICD-10-CM

## 2023-08-29 DIAGNOSIS — Z23 IMMUNIZATION DUE: ICD-10-CM

## 2023-08-29 DIAGNOSIS — E78.2 MIXED HYPERLIPIDEMIA: ICD-10-CM

## 2023-08-29 DIAGNOSIS — R30.0 DYSURIA: ICD-10-CM

## 2023-08-29 LAB
BILIRUBIN, URINE, POC: NEGATIVE
BLOOD URINE, POC: NORMAL
GLUCOSE URINE, POC: NEGATIVE
KETONES, URINE, POC: NEGATIVE
LEUKOCYTE ESTERASE, URINE, POC: NORMAL
NITRITE, URINE, POC: NEGATIVE
PH, URINE, POC: 7 (ref 4.6–8)
PROTEIN,URINE, POC: NEGATIVE
SPECIFIC GRAVITY, URINE, POC: 1.01 (ref 1–1.03)
URINALYSIS CLARITY, POC: CLEAR
URINALYSIS COLOR, POC: YELLOW
UROBILINOGEN, POC: NORMAL

## 2023-08-29 PROCEDURE — 99214 OFFICE O/P EST MOD 30 MIN: CPT | Performed by: FAMILY MEDICINE

## 2023-08-29 PROCEDURE — 81001 URINALYSIS AUTO W/SCOPE: CPT | Performed by: FAMILY MEDICINE

## 2023-08-29 PROCEDURE — 90471 IMMUNIZATION ADMIN: CPT | Performed by: FAMILY MEDICINE

## 2023-08-29 PROCEDURE — 90715 TDAP VACCINE 7 YRS/> IM: CPT | Performed by: FAMILY MEDICINE

## 2023-08-29 RX ORDER — ZOSTER VACCINE RECOMBINANT, ADJUVANTED 50 MCG/0.5
0.5 KIT INTRAMUSCULAR SEE ADMIN INSTRUCTIONS
Qty: 0.5 ML | Refills: 1 | Status: SHIPPED | OUTPATIENT
Start: 2023-08-29 | End: 2024-02-25

## 2023-08-29 SDOH — ECONOMIC STABILITY: FOOD INSECURITY: WITHIN THE PAST 12 MONTHS, YOU WORRIED THAT YOUR FOOD WOULD RUN OUT BEFORE YOU GOT MONEY TO BUY MORE.: NEVER TRUE

## 2023-08-29 SDOH — ECONOMIC STABILITY: INCOME INSECURITY: HOW HARD IS IT FOR YOU TO PAY FOR THE VERY BASICS LIKE FOOD, HOUSING, MEDICAL CARE, AND HEATING?: NOT HARD AT ALL

## 2023-08-29 SDOH — ECONOMIC STABILITY: FOOD INSECURITY: WITHIN THE PAST 12 MONTHS, THE FOOD YOU BOUGHT JUST DIDN'T LAST AND YOU DIDN'T HAVE MONEY TO GET MORE.: NEVER TRUE

## 2023-08-29 ASSESSMENT — PATIENT HEALTH QUESTIONNAIRE - PHQ9
2. FEELING DOWN, DEPRESSED OR HOPELESS: 0
SUM OF ALL RESPONSES TO PHQ QUESTIONS 1-9: 0
1. LITTLE INTEREST OR PLEASURE IN DOING THINGS: 0
SUM OF ALL RESPONSES TO PHQ9 QUESTIONS 1 & 2: 0
SUM OF ALL RESPONSES TO PHQ QUESTIONS 1-9: 0

## 2023-08-29 ASSESSMENT — LIFESTYLE VARIABLES
HOW OFTEN DO YOU HAVE A DRINK CONTAINING ALCOHOL: NEVER
HOW MANY STANDARD DRINKS CONTAINING ALCOHOL DO YOU HAVE ON A TYPICAL DAY: PATIENT DOES NOT DRINK

## 2023-08-29 ASSESSMENT — ANXIETY QUESTIONNAIRES
7. FEELING AFRAID AS IF SOMETHING AWFUL MIGHT HAPPEN: 0
6. BECOMING EASILY ANNOYED OR IRRITABLE: 0
3. WORRYING TOO MUCH ABOUT DIFFERENT THINGS: 0
1. FEELING NERVOUS, ANXIOUS, OR ON EDGE: 0
IF YOU CHECKED OFF ANY PROBLEMS ON THIS QUESTIONNAIRE, HOW DIFFICULT HAVE THESE PROBLEMS MADE IT FOR YOU TO DO YOUR WORK, TAKE CARE OF THINGS AT HOME, OR GET ALONG WITH OTHER PEOPLE: NOT DIFFICULT AT ALL
4. TROUBLE RELAXING: 0

## 2023-08-30 LAB
ALBUMIN SERPL-MCNC: 4.7 G/DL (ref 3.8–4.9)
ALBUMIN/GLOB SERPL: 1.4 {RATIO} (ref 1.2–2.2)
ALP SERPL-CCNC: 103 IU/L (ref 44–121)
ALT SERPL-CCNC: 18 IU/L (ref 0–32)
AST SERPL-CCNC: 18 IU/L (ref 0–40)
BILIRUB SERPL-MCNC: 0.5 MG/DL (ref 0–1.2)
BUN SERPL-MCNC: 10 MG/DL (ref 6–24)
BUN/CREAT SERPL: 14 (ref 9–23)
CALCIUM SERPL-MCNC: 9.6 MG/DL (ref 8.7–10.2)
CHLORIDE SERPL-SCNC: 104 MMOL/L (ref 96–106)
CHOLEST SERPL-MCNC: 161 MG/DL (ref 100–199)
CO2 SERPL-SCNC: 25 MMOL/L (ref 20–29)
CREAT SERPL-MCNC: 0.69 MG/DL (ref 0.57–1)
EGFRCR SERPLBLD CKD-EPI 2021: 101 ML/MIN/1.73
GLOBULIN SER CALC-MCNC: 3.3 G/DL (ref 1.5–4.5)
GLUCOSE SERPL-MCNC: 97 MG/DL (ref 70–99)
HDLC SERPL-MCNC: 52 MG/DL
LDLC SERPL CALC-MCNC: 83 MG/DL (ref 0–99)
POTASSIUM SERPL-SCNC: 4.4 MMOL/L (ref 3.5–5.2)
PROT SERPL-MCNC: 8 G/DL (ref 6–8.5)
SODIUM SERPL-SCNC: 141 MMOL/L (ref 134–144)
TRIGL SERPL-MCNC: 149 MG/DL (ref 0–149)
VLDLC SERPL CALC-MCNC: 26 MG/DL (ref 5–40)

## 2024-01-15 ENCOUNTER — HOSPITAL ENCOUNTER (EMERGENCY)
Facility: HOSPITAL | Age: 59
Discharge: HOME | End: 2024-01-15
Attending: EMERGENCY MEDICINE
Payer: MEDICAID

## 2024-01-15 ENCOUNTER — APPOINTMENT (OUTPATIENT)
Facility: HOSPITAL | Age: 59
End: 2024-01-15
Payer: MEDICAID

## 2024-01-15 VITALS
DIASTOLIC BLOOD PRESSURE: 62 MMHG | RESPIRATION RATE: 16 BRPM | OXYGEN SATURATION: 98 % | SYSTOLIC BLOOD PRESSURE: 135 MMHG | HEART RATE: 85 BPM | HEIGHT: 66 IN | TEMPERATURE: 98.1 F | BODY MASS INDEX: 26.2 KG/M2 | WEIGHT: 163 LBS

## 2024-01-15 DIAGNOSIS — J01.00 ACUTE NON-RECURRENT MAXILLARY SINUSITIS: Primary | ICD-10-CM

## 2024-01-15 PROCEDURE — 99283 EMERGENCY DEPT VISIT LOW MDM: CPT

## 2024-01-15 PROCEDURE — 71046 X-RAY EXAM CHEST 2 VIEWS: CPT

## 2024-01-15 RX ORDER — PSEUDOEPHEDRINE HCL 120 MG/1
120 TABLET, FILM COATED, EXTENDED RELEASE ORAL EVERY 12 HOURS PRN
Qty: 14 TABLET | Refills: 1 | Status: SHIPPED | OUTPATIENT
Start: 2024-01-15

## 2024-01-15 RX ORDER — OXYMETAZOLINE HYDROCHLORIDE 0.05 G/100ML
2 SPRAY NASAL 2 TIMES DAILY
Qty: 15 ML | Refills: 3 | Status: SHIPPED | OUTPATIENT
Start: 2024-01-15 | End: 2024-01-18

## 2024-01-15 RX ORDER — AMOXICILLIN AND CLAVULANATE POTASSIUM 875; 125 MG/1; MG/1
1 TABLET, FILM COATED ORAL 2 TIMES DAILY
Qty: 14 TABLET | Refills: 0 | Status: SHIPPED | OUTPATIENT
Start: 2024-01-15 | End: 2024-01-22

## 2024-01-15 RX ORDER — NAPROXEN 500 MG/1
500 TABLET ORAL 2 TIMES DAILY PRN
Qty: 30 TABLET | Refills: 0 | Status: SHIPPED | OUTPATIENT
Start: 2024-01-15

## 2024-01-15 RX ORDER — BENZONATATE 200 MG/1
200 CAPSULE ORAL 3 TIMES DAILY PRN
Qty: 30 CAPSULE | Refills: 0 | Status: SHIPPED | OUTPATIENT
Start: 2024-01-15

## 2024-01-15 ASSESSMENT — PAIN - FUNCTIONAL ASSESSMENT: PAIN_FUNCTIONAL_ASSESSMENT: 0-10

## 2024-01-15 ASSESSMENT — PAIN DESCRIPTION - DESCRIPTORS: DESCRIPTORS: ACHING

## 2024-01-15 ASSESSMENT — ENCOUNTER SYMPTOMS
EYE DISCHARGE: 0
ABDOMINAL PAIN: 0
COUGH: 1
SHORTNESS OF BREATH: 0

## 2024-01-15 ASSESSMENT — PAIN SCALES - GENERAL: PAINLEVEL_OUTOF10: 8

## 2024-01-15 ASSESSMENT — PAIN DESCRIPTION - LOCATION: LOCATION: FACE

## 2024-01-15 NOTE — ED TRIAGE NOTES
Pt arrives co cough and congestion since the week before [place] and over the last 2 weeks she has noted pain to her face, eyes and ear, states the right side is worse

## 2024-01-15 NOTE — ED PROVIDER NOTES
[facility] [place] EMERGENCY DEPT  EMERGENCY DEPARTMENT ENCOUNTER      Pt Name: Maribel Nichols  MRN: [id number]  Birthdate 1965  Date of evaluation: 1/15/2024  Provider: Millicent Panchal PA-C    CHIEF COMPLAINT       Chief Complaint   Patient presents with    Cough    Congestion         HISTORY OF PRESENT ILLNESS    HPI  Patient is a 58 y.o. female who presents today with complaints of cough, nasal congestion. Symptoms started 21 days ago. Eating and drinking. Denies any change in voice, difficulty handling secretions, no facial or oral swelling.  Denies any syncope, seizure, focal weakness.  Denies any difficulty breathing, choking, wheezing, dyspnea.  Denies fever, abdominal pain, nausea, vomiting, dysuria.  Denies chest pain, shortness of breath.      Nursing Notes were reviewed.      REVIEW OF SYSTEMS       Review of Systems   Constitutional:  Negative for fever.   HENT:  Positive for congestion.    Eyes:  Negative for discharge.   Respiratory:  Positive for cough. Negative for shortness of breath.    Cardiovascular:  Negative for chest pain.   Gastrointestinal:  Negative for abdominal pain.   Genitourinary:  Negative for dysuria.   Skin:  Negative for wound.   Neurological:  Negative for seizures.   Psychiatric/Behavioral:  Negative for suicidal ideas.        Except as noted above the remainder of the review of systems was reviewed and negative.       PAST MEDICAL HISTORY     Past Medical History:   Diagnosis Date    Fibroid, uterine     History of seasonal allergies          SURGICAL HISTORY       Past Surgical History:   Procedure Laterality Date    GYN               CURRENT MEDICATIONS       Previous Medications    FLUTICASONE (FLONASE) 50 MCG/ACT NASAL SPRAY    1 spray by Each Nostril route daily    LEVOCETIRIZINE (XYZAL) 5 MG TABLET    TAKE ONE TABLET BY MOUTH EVERY NIGHT AT BEDTIME AS NEEDED FOR ALLERGIES OR ITCHING    PANTOPRAZOLE (PROTONIX) 20 MG TABLET    Take 1 tablet by mouth daily    ROSUVASTATIN

## 2024-02-14 ENCOUNTER — HOSPITAL ENCOUNTER (EMERGENCY)
Facility: HOSPITAL | Age: 59
Discharge: HOME | End: 2024-02-14
Attending: EMERGENCY MEDICINE
Payer: MEDICAID

## 2024-02-14 VITALS
HEIGHT: 66 IN | WEIGHT: 172 LBS | TEMPERATURE: 98.7 F | SYSTOLIC BLOOD PRESSURE: 137 MMHG | DIASTOLIC BLOOD PRESSURE: 72 MMHG | RESPIRATION RATE: 16 BRPM | OXYGEN SATURATION: 97 % | BODY MASS INDEX: 27.64 KG/M2 | HEART RATE: 62 BPM

## 2024-02-14 DIAGNOSIS — N39.0 ACUTE UTI: Primary | ICD-10-CM

## 2024-02-14 LAB
APPEARANCE UR: CLEAR
BACTERIA URNS QL MICRO: NEGATIVE /HPF
BILIRUB UR QL: NEGATIVE
CLUE CELLS VAG QL WET PREP: NORMAL
COLOR UR: ABNORMAL
EPITH CASTS URNS QL MICRO: ABNORMAL /LPF
GLUCOSE UR STRIP.AUTO-MCNC: NEGATIVE MG/DL
HGB UR QL STRIP: ABNORMAL
KETONES UR QL STRIP.AUTO: NEGATIVE MG/DL
KOH PREP SPEC: NORMAL
LEUKOCYTE ESTERASE UR QL STRIP.AUTO: ABNORMAL
NITRITE UR QL STRIP.AUTO: NEGATIVE
PH UR STRIP: 7 (ref 5–8)
PROT UR STRIP-MCNC: NEGATIVE MG/DL
RBC #/AREA URNS HPF: ABNORMAL /HPF
SERVICE CMNT-IMP: NORMAL
SP GR UR REFRACTOMETRY: 1 (ref 1–1.03)
T VAGINALIS VAG QL WET PREP: NORMAL
URINE CULTURE IF INDICATED: ABNORMAL
UROBILINOGEN UR QL STRIP.AUTO: 0.2 EU/DL (ref 0.2–1)
WBC URNS QL MICRO: ABNORMAL /HPF (ref 0–4)

## 2024-02-14 PROCEDURE — 99283 EMERGENCY DEPT VISIT LOW MDM: CPT

## 2024-02-14 PROCEDURE — 87491 CHLMYD TRACH DNA AMP PROBE: CPT

## 2024-02-14 PROCEDURE — 87077 CULTURE AEROBIC IDENTIFY: CPT

## 2024-02-14 PROCEDURE — 87186 SC STD MICRODIL/AGAR DIL: CPT

## 2024-02-14 PROCEDURE — 87086 URINE CULTURE/COLONY COUNT: CPT

## 2024-02-14 PROCEDURE — 87210 SMEAR WET MOUNT SALINE/INK: CPT

## 2024-02-14 PROCEDURE — 87591 N.GONORRHOEAE DNA AMP PROB: CPT

## 2024-02-14 PROCEDURE — 81001 URINALYSIS AUTO W/SCOPE: CPT

## 2024-02-14 RX ORDER — CEPHALEXIN 500 MG/1
500 CAPSULE ORAL 2 TIMES DAILY
Qty: 14 CAPSULE | Refills: 0 | Status: SHIPPED | OUTPATIENT
Start: 2024-02-14 | End: 2024-02-21

## 2024-02-14 NOTE — ED TRIAGE NOTES
Pt presents ambulatory into ed a&ox3, no acute distress, breaths even and unlabored c/o urinary burning, frequency, hematuria and dysuria with lower abdominal pain since Sunday. Pt also reports she was here a month ago for cold symptoms and was put on abx and after she finished abx, her symptoms came back.

## 2024-02-14 NOTE — DISCHARGE INSTRUCTIONS
Discussed visit today.  Please start taking the antibiotic for the full course even if you start to feel better.  Please follow-up with your primary care provider for further evaluation and the recurrent urinary tract infections.  Please take Tylenol and Motrin at home for the pain.    Return to the emergency room with any worsening of symptoms.

## 2024-02-14 NOTE — ED PROVIDER NOTES
[facility] [place]  EMERGENCY DEPARTMENT ENCOUNTER      Pt Name: Maribel Nichols  MRN: [id number]  Birthdate 1965  Date of evaluation: 2024  Provider: Marquez Heard PA-C    CHIEF COMPLAINT       Chief Complaint   Patient presents with    Urinary Pain         HISTORY OF PRESENT ILLNESS    Patient is an 58 y.o. female with history of fibroid uterus and seasonal allergies who presents to the ER with her right side of dysuria, frequency, and hematuria as well as lower abdominal pain since .  Patient reports she thinks that she has a urinary tract infection.  Patient reports she has had them in the past and her last 1 was in December.  Patient also reports that she was seen here a month ago for upper respiratory and symptoms in which she was given an antibiotic and started feeling better, but as soon as she stopped the antibiotic her symptoms came back.  Patient has not taken anything for pain prior to arrival.  Patient denies chest pain, shortness of breath, nausea or vomiting, diarrhea constipation, headache, dizziness, lightheadedness, fever, or chills.  Patient denies alcohol use, smoking/vaping or illicit drug use.  Patient reports she is sexually active with mild concern for an sexually transmitted infection and would like to be tested today. Patient also reports some white/brownish discharge.           Nursing Notes were reviewed.    REVIEW OF SYSTEMS       Review of Systems   Genitourinary:  Positive for dysuria, frequency, hematuria and pelvic pain.   All other systems reviewed and are negative.        PAST MEDICAL HISTORY     Past Medical History:   Diagnosis Date    Fibroid, uterine     History of seasonal allergies          SURGICAL HISTORY       Past Surgical History:   Procedure Laterality Date    GYN               CURRENT MEDICATIONS       Discharge Medication List as of 2024 12:12 PM        CONTINUE these medications which have NOT CHANGED    Details   benzonatate (TESSALON)

## 2024-02-15 LAB
C TRACH DNA SPEC QL NAA+PROBE: NEGATIVE
N GONORRHOEA DNA SPEC QL NAA+PROBE: NEGATIVE
SAMPLE TYPE: NORMAL
SERVICE CMNT-IMP: NORMAL
SPECIMEN SOURCE: NORMAL

## 2024-02-16 LAB
BACTERIA SPEC CULT: ABNORMAL
CC UR VC: ABNORMAL
SERVICE CMNT-IMP: ABNORMAL

## 2024-02-29 ENCOUNTER — OFFICE VISIT (OUTPATIENT)
Dept: PRIMARY CARE CLINIC | Facility: CLINIC | Age: 59
End: 2024-02-29

## 2024-02-29 VITALS
SYSTOLIC BLOOD PRESSURE: 108 MMHG | HEIGHT: 67 IN | BODY MASS INDEX: 27.34 KG/M2 | HEART RATE: 60 BPM | WEIGHT: 174.2 LBS | OXYGEN SATURATION: 99 % | TEMPERATURE: 98.1 F | DIASTOLIC BLOOD PRESSURE: 83 MMHG | RESPIRATION RATE: 16 BRPM

## 2024-02-29 DIAGNOSIS — J18.9 PNEUMONIA OF RIGHT LUNG DUE TO INFECTIOUS ORGANISM, UNSPECIFIED PART OF LUNG: ICD-10-CM

## 2024-02-29 DIAGNOSIS — E78.2 MIXED HYPERLIPIDEMIA: Primary | ICD-10-CM

## 2024-02-29 RX ORDER — DOXYCYCLINE HYCLATE 100 MG
100 TABLET ORAL 2 TIMES DAILY
Qty: 20 TABLET | Refills: 0 | Status: SHIPPED | OUTPATIENT
Start: 2024-02-29 | End: 2024-03-10

## 2024-02-29 ASSESSMENT — PATIENT HEALTH QUESTIONNAIRE - PHQ9
2. FEELING DOWN, DEPRESSED OR HOPELESS: 0
SUM OF ALL RESPONSES TO PHQ QUESTIONS 1-9: 0
SUM OF ALL RESPONSES TO PHQ9 QUESTIONS 1 & 2: 0
1. LITTLE INTEREST OR PLEASURE IN DOING THINGS: 0

## 2024-02-29 NOTE — PATIENT INSTRUCTIONS
Actifed  Cold & Allergy (Chlorpheniramine maleate 4mg & [id number]) 1 every 8 hours.    [email or web address] Anti-Stress Sinus Pillow Heat for 1 minute in Microwave (approximately) and place on head;reheat at 30 sec intervals.     Afrin 12 hour nasal spray - 1 spray each nostril 20 min prior to using a Neti Pot twice daily.     Gargle with 1/2 Peroxide and 1/2 Mouthwash - gargle the octaves. Do  over the sink and do NOT swallow. Use 1-2 oz. Cannot save the solution.     While sick 4,000mg Vitamin C     Zinc 50mg daily

## 2024-02-29 NOTE — PROGRESS NOTES
Maribel Nichols is a 58 y.o. female who was seen in clinic today (2/29/2024).    Assessment & Plan:   Below is the assessment and plan developed based on review of pertinent history, physical exam, labs, studies, and medications.    1. Mixed hyperlipidemia  -     CBC with Auto Differential  -     Lipid Panel  -     Comprehensive Metabolic Panel  2. Pneumonia of right lung due to infectious organism, unspecified part of lung  -     doxycycline hyclate (VIBRA-TABS) 100 MG tablet; Take 1 tablet by mouth 2 times daily for 10 days, Disp-20 tablet, R-0Normal      Patient Instructions   Actifed  Cold & Allergy (Chlorpheniramine maleate 4mg & [id number]) 1 every 8 hours.    [employer] Anti-Stress Sinus Pillow Heat for 1 minute in Microwave (approximately) and place on head;reheat at 30 sec intervals.     Afrin 12 hour nasal spray - 1 spray each nostril 20 min prior to using a Neti Pot twice daily.     Gargle with 1/2 Peroxide and 1/2 Mouthwash - gargle the octaves. Do  over the sink and do NOT swallow. Use 1-2 oz. Cannot save the solution.     While sick 4,000mg Vitamin C     Zinc 50mg daily           Return in about 10 days (around 3/10/2024), or if symptoms worsen or fail to improve.    Subjective:   Maribel was seen today for Other (Lingering dry cough)  The patient tells me that she was seen in the ER twice.Once she was seen and treated for and acute sinusitis with Augmentin. The second time she was seen for a uti and given Keflex to treat  it.   She comes today with a lingering cough.   Review of Systems   Constitutional: Negative.    HENT:  Positive for congestion, postnasal drip and rhinorrhea.    Respiratory:  Positive for cough.    Cardiovascular: Negative.    Gastrointestinal: Negative.    Psychiatric/Behavioral: Negative.            Objective:     Vitals:    02/29/24 1017   BP: 108/83   Site: Left Upper Arm   Position: Sitting   Cuff Size: Medium Adult   Pulse: 60   Resp: 16   Temp: 98.1 °F (36.7 °C)

## 2024-02-29 NOTE — PROGRESS NOTES
\"Have you been to the ER, urgent care clinic since your last visit?  Hospitalized since your last visit?\"    YES - When: approximately 1  weeks ago.  Where and Why: [facility] [place].    “Have you seen or consulted any other health care providers outside of [facility] since your last visit?”    NO

## 2024-03-01 LAB
ALBUMIN SERPL-MCNC: 4.6 G/DL (ref 3.8–4.9)
ALBUMIN/GLOB SERPL: 1.4 {RATIO} (ref 1.2–2.2)
ALP SERPL-CCNC: 100 IU/L (ref 44–121)
ALT SERPL-CCNC: 16 IU/L (ref 0–32)
AST SERPL-CCNC: 17 IU/L (ref 0–40)
BASOPHILS # BLD AUTO: 0 X10E3/UL (ref 0–0.2)
BASOPHILS NFR BLD AUTO: 1 %
BILIRUB SERPL-MCNC: 0.5 MG/DL (ref 0–1.2)
BUN SERPL-MCNC: 11 MG/DL (ref 6–24)
BUN/CREAT SERPL: 17 (ref 9–23)
CALCIUM SERPL-MCNC: 9.3 MG/DL (ref 8.7–10.2)
CHLORIDE SERPL-SCNC: 101 MMOL/L (ref 96–106)
CHOLEST SERPL-MCNC: 274 MG/DL (ref 100–199)
CO2 SERPL-SCNC: 24 MMOL/L (ref 20–29)
CREAT SERPL-MCNC: 0.64 MG/DL (ref 0.57–1)
EGFRCR SERPLBLD CKD-EPI 2021: 102 ML/MIN/1.73
EOSINOPHIL # BLD AUTO: 0.1 X10E3/UL (ref 0–0.4)
EOSINOPHIL NFR BLD AUTO: 2 %
ERYTHROCYTE [DISTWIDTH] IN BLOOD BY AUTOMATED COUNT: 12.7 % (ref 11.7–15.4)
GLOBULIN SER CALC-MCNC: 3.4 G/DL (ref 1.5–4.5)
GLUCOSE SERPL-MCNC: 93 MG/DL (ref 70–99)
HCT VFR BLD AUTO: 38.1 % (ref 34–46.6)
HDLC SERPL-MCNC: 51 MG/DL
HGB BLD-MCNC: 13.2 G/DL (ref 11.1–15.9)
IMM GRANULOCYTES # BLD AUTO: 0 X10E3/UL (ref 0–0.1)
IMM GRANULOCYTES NFR BLD AUTO: 0 %
LABORATORY COMMENT REPORT: ABNORMAL
LDLC SERPL CALC-MCNC: 193 MG/DL (ref 0–99)
LYMPHOCYTES # BLD AUTO: 2 X10E3/UL (ref 0.7–3.1)
LYMPHOCYTES NFR BLD AUTO: 46 %
MCH RBC QN AUTO: 30.8 PG (ref 26.6–33)
MCHC RBC AUTO-ENTMCNC: 34.6 G/DL (ref 31.5–35.7)
MCV RBC AUTO: 89 FL (ref 79–97)
MONOCYTES # BLD AUTO: 0.4 X10E3/UL (ref 0.1–0.9)
MONOCYTES NFR BLD AUTO: 10 %
NEUTROPHILS # BLD AUTO: 1.8 X10E3/UL (ref 1.4–7)
NEUTROPHILS NFR BLD AUTO: 41 %
PLATELET # BLD AUTO: 266 X10E3/UL (ref 150–450)
POTASSIUM SERPL-SCNC: 4.3 MMOL/L (ref 3.5–5.2)
PROT SERPL-MCNC: 8 G/DL (ref 6–8.5)
RBC # BLD AUTO: 4.28 X10E6/UL (ref 3.77–5.28)
SODIUM SERPL-SCNC: 139 MMOL/L (ref 134–144)
TRIGL SERPL-MCNC: 163 MG/DL (ref 0–149)
VLDLC SERPL CALC-MCNC: 30 MG/DL (ref 5–40)
WBC # BLD AUTO: 4.3 X10E3/UL (ref 3.4–10.8)

## 2024-03-01 RX ORDER — ATORVASTATIN CALCIUM 10 MG/1
10 TABLET, FILM COATED ORAL DAILY
Qty: 90 TABLET | Refills: 0 | Status: SHIPPED | OUTPATIENT
Start: 2024-03-01

## 2024-03-01 ASSESSMENT — ENCOUNTER SYMPTOMS
RHINORRHEA: 1
COUGH: 1
GASTROINTESTINAL NEGATIVE: 1

## 2024-03-14 ENCOUNTER — OFFICE VISIT (OUTPATIENT)
Dept: PRIMARY CARE CLINIC | Facility: CLINIC | Age: 59
End: 2024-03-14
Payer: MEDICAID

## 2024-03-14 VITALS
RESPIRATION RATE: 16 BRPM | BODY MASS INDEX: 29.46 KG/M2 | WEIGHT: 176.8 LBS | HEIGHT: 65 IN | TEMPERATURE: 98.2 F | DIASTOLIC BLOOD PRESSURE: 78 MMHG | SYSTOLIC BLOOD PRESSURE: 122 MMHG | OXYGEN SATURATION: 98 % | HEART RATE: 70 BPM

## 2024-03-14 DIAGNOSIS — J18.9 PNEUMONIA DUE TO INFECTIOUS ORGANISM, UNSPECIFIED LATERALITY, UNSPECIFIED PART OF LUNG: ICD-10-CM

## 2024-03-14 DIAGNOSIS — E78.2 MIXED HYPERLIPIDEMIA: Primary | ICD-10-CM

## 2024-03-14 PROCEDURE — 96372 THER/PROPH/DIAG INJ SC/IM: CPT | Performed by: PREVENTIVE MEDICINE

## 2024-03-14 PROCEDURE — 99214 OFFICE O/P EST MOD 30 MIN: CPT | Performed by: PREVENTIVE MEDICINE

## 2024-03-14 RX ORDER — SULFAMETHOXAZOLE AND TRIMETHOPRIM 800; 160 MG/1; MG/1
1 TABLET ORAL 2 TIMES DAILY
Qty: 28 TABLET | Refills: 0 | Status: SHIPPED | OUTPATIENT
Start: 2024-03-14 | End: 2024-03-28

## 2024-03-14 RX ORDER — ROSUVASTATIN CALCIUM 5 MG/1
5 TABLET, COATED ORAL DAILY
Qty: 90 TABLET | Refills: 0 | Status: SHIPPED | OUTPATIENT
Start: 2024-03-14

## 2024-03-14 RX ORDER — CEFTRIAXONE 1 G/1
1000 INJECTION, POWDER, FOR SOLUTION INTRAMUSCULAR; INTRAVENOUS ONCE
Status: DISCONTINUED | OUTPATIENT
Start: 2024-03-14 | End: 2024-03-14

## 2024-03-14 RX ORDER — SULFAMETHOXAZOLE AND TRIMETHOPRIM 800; 160 MG/1; MG/1
1 TABLET ORAL 2 TIMES DAILY
Qty: 20 TABLET | Refills: 0 | Status: SHIPPED | OUTPATIENT
Start: 2024-03-14 | End: 2024-03-14

## 2024-03-14 RX ORDER — CEFTRIAXONE 1 G/1
500 INJECTION, POWDER, FOR SOLUTION INTRAMUSCULAR; INTRAVENOUS ONCE
Status: COMPLETED | OUTPATIENT
Start: 2024-03-14 | End: 2024-03-14

## 2024-03-14 RX ADMIN — CEFTRIAXONE 500 MG: 1 INJECTION, POWDER, FOR SOLUTION INTRAMUSCULAR; INTRAVENOUS at 16:30

## 2024-03-14 ASSESSMENT — ENCOUNTER SYMPTOMS
COUGH: 1
GASTROINTESTINAL NEGATIVE: 1
EYES NEGATIVE: 1
SINUS PRESSURE: 1
SINUS PAIN: 1

## 2024-03-14 NOTE — PATIENT INSTRUCTIONS
Afrin 12 hour nasal spray - 1 spray each nostril 20 min prior to using a Neti Pot twice daily.      Gargle with 1/2 Peroxide and 1/2 Mouthwash - gargle the octaves. Do  over the sink and do NOT swallow. Use 1-2 oz. Cannot save the solution.     DAY:Take antihistamine/decongestant sold by [employer] as [employer] Maximum Strength Sinus PE & Allergy Medicine ($3.98) and by [employer] as [employer] Sinus PE+Allergy ($8.79), (Chlorpheniramine maleate 4mg & [id number]) Take 1 pill every 8 hours.    NIGHT: Chlorpheniramine maleate 4mg  Take 1-2 tablets at bedtime.     While sick only, take 4,000mg Vitamin C.      Zinc 50mg daily.    [employer] Anti-Stress Sinus Pillow Heat for 1 minute in Microwave (approximately) and place on head;reheat at 30 sec intervals.

## 2024-03-14 NOTE — PROGRESS NOTES
\"Have you been to the ER, urgent care clinic since your last visit?  Hospitalized since your last visit?\"    NO    “Have you seen or consulted any other health care providers outside of [facility] since your last visit?”    NO            
cough.         Review of Systems   HENT:  Positive for postnasal drip, sinus pressure and sinus pain. Negative for ear discharge.    Eyes: Negative.    Respiratory:  Positive for cough.    Cardiovascular: Negative.    Gastrointestinal: Negative.    Neurological: Negative.    Psychiatric/Behavioral: Negative.            Objective:     Vitals:    03/14/24 1518   BP: 122/78   Site: Left Upper Arm   Position: Sitting   Cuff Size: Medium Adult   Pulse: 70   Resp: 16   Temp: 98.2 °F (36.8 °C)   TempSrc: Oral   SpO2: 98%   Weight: 80.2 kg (176 lb 12.8 oz)   Height: 1.651 m (5' 5\")      Body mass index is 29.42 kg/m².     Physical Exam  Constitutional:       General: She is not in acute distress.     Appearance: Normal appearance. She is ill-appearing and toxic-appearing.      Comments: Tired and worn looking.   HENT:      Head: Normocephalic and atraumatic.      Right Ear: Tympanic membrane, ear canal and external ear normal.      Left Ear: Tympanic membrane, ear canal and external ear normal.      Nose: Congestion and rhinorrhea present.      Mouth/Throat:      Mouth: Mucous membranes are moist.      Pharynx: Oropharyngeal exudate present. No posterior oropharyngeal erythema.   Eyes:      Extraocular Movements: Extraocular movements intact.      Conjunctiva/sclera: Conjunctivae normal.   Cardiovascular:      Rate and Rhythm: Normal rate and regular rhythm.      Pulses: Normal pulses.      Heart sounds: Normal heart sounds. No murmur heard.     No friction rub. No gallop.   Pulmonary:      Breath sounds: Examination of the right-upper field reveals wheezing, rhonchi and rales. Examination of the left-upper field reveals wheezing, rhonchi and rales. Examination of the right-middle field reveals wheezing, rhonchi and rales. Examination of the right-lower field reveals wheezing, rhonchi and rales. Wheezing, rhonchi and rales present.   Musculoskeletal:      Cervical back: Normal range of motion and neck supple. No tenderness.

## 2024-06-27 ENCOUNTER — HOSPITAL ENCOUNTER (EMERGENCY)
Facility: HOSPITAL | Age: 59
Discharge: HOME | End: 2024-06-27
Attending: EMERGENCY MEDICINE
Payer: MEDICAID

## 2024-06-27 VITALS
BODY MASS INDEX: 26.2 KG/M2 | RESPIRATION RATE: 18 BRPM | SYSTOLIC BLOOD PRESSURE: 111 MMHG | HEIGHT: 66 IN | DIASTOLIC BLOOD PRESSURE: 76 MMHG | OXYGEN SATURATION: 97 % | HEART RATE: 73 BPM | WEIGHT: 163 LBS | TEMPERATURE: 98.5 F

## 2024-06-27 DIAGNOSIS — N30.00 ACUTE CYSTITIS WITHOUT HEMATURIA: Primary | ICD-10-CM

## 2024-06-27 LAB
APPEARANCE UR: ABNORMAL
BACTERIA URNS QL MICRO: ABNORMAL /HPF
BILIRUB UR QL: NEGATIVE
COLOR UR: ABNORMAL
EPITH CASTS URNS QL MICRO: ABNORMAL /LPF
GLUCOSE UR STRIP.AUTO-MCNC: NEGATIVE MG/DL
HGB UR QL STRIP: ABNORMAL
KETONES UR QL STRIP.AUTO: NEGATIVE MG/DL
LEUKOCYTE ESTERASE UR QL STRIP.AUTO: ABNORMAL
NITRITE UR QL STRIP.AUTO: NEGATIVE
PH UR STRIP: 7 (ref 5–8)
PROT UR STRIP-MCNC: NEGATIVE MG/DL
RBC #/AREA URNS HPF: ABNORMAL /HPF
SP GR UR REFRACTOMETRY: 1.01 (ref 1–1.03)
SPECIMEN HOLD: NORMAL
UROBILINOGEN UR QL STRIP.AUTO: 0.2 EU/DL (ref 0.2–1)
WBC URNS QL MICRO: ABNORMAL /HPF (ref 0–4)

## 2024-06-27 PROCEDURE — 87086 URINE CULTURE/COLONY COUNT: CPT

## 2024-06-27 PROCEDURE — 81001 URINALYSIS AUTO W/SCOPE: CPT

## 2024-06-27 PROCEDURE — 99283 EMERGENCY DEPT VISIT LOW MDM: CPT

## 2024-06-27 PROCEDURE — 6370000000 HC RX 637 (ALT 250 FOR IP): Performed by: STUDENT IN AN ORGANIZED HEALTH CARE EDUCATION/TRAINING PROGRAM

## 2024-06-27 RX ORDER — PHENAZOPYRIDINE HYDROCHLORIDE 100 MG/1
200 TABLET, FILM COATED ORAL
Status: COMPLETED | OUTPATIENT
Start: 2024-06-27 | End: 2024-06-27

## 2024-06-27 RX ORDER — PHENAZOPYRIDINE HYDROCHLORIDE 200 MG/1
200 TABLET, FILM COATED ORAL 3 TIMES DAILY PRN
Qty: 9 TABLET | Refills: 0 | Status: SHIPPED | OUTPATIENT
Start: 2024-06-27 | End: 2024-06-30

## 2024-06-27 RX ORDER — CEPHALEXIN 500 MG/1
500 CAPSULE ORAL 4 TIMES DAILY
Qty: 28 CAPSULE | Refills: 0 | Status: SHIPPED | OUTPATIENT
Start: 2024-06-27

## 2024-06-27 RX ADMIN — PHENAZOPYRIDINE 200 MG: 100 TABLET ORAL at 18:18

## 2024-06-27 ASSESSMENT — PAIN DESCRIPTION - LOCATION: LOCATION: ABDOMEN

## 2024-06-27 ASSESSMENT — ENCOUNTER SYMPTOMS
DIARRHEA: 0
NAUSEA: 0
SHORTNESS OF BREATH: 0
EYE DISCHARGE: 0
COLOR CHANGE: 0
SORE THROAT: 0
WHEEZING: 0
COUGH: 0
RHINORRHEA: 0
SINUS PAIN: 0
BACK PAIN: 0
VOMITING: 0
ABDOMINAL PAIN: 0

## 2024-06-27 ASSESSMENT — PAIN - FUNCTIONAL ASSESSMENT: PAIN_FUNCTIONAL_ASSESSMENT: 0-10

## 2024-06-27 ASSESSMENT — PAIN SCALES - GENERAL: PAINLEVEL_OUTOF10: 9

## 2024-06-27 ASSESSMENT — LIFESTYLE VARIABLES
HOW MANY STANDARD DRINKS CONTAINING ALCOHOL DO YOU HAVE ON A TYPICAL DAY: PATIENT DOES NOT DRINK
HOW OFTEN DO YOU HAVE A DRINK CONTAINING ALCOHOL: NEVER

## 2024-06-27 ASSESSMENT — PAIN DESCRIPTION - ORIENTATION: ORIENTATION: LEFT;RIGHT;LOWER

## 2024-06-27 ASSESSMENT — PAIN DESCRIPTION - DESCRIPTORS: DESCRIPTORS: CRAMPING;BURNING

## 2024-06-27 NOTE — ED TRIAGE NOTES
Pt ambulated to ED.  Pt reports having a UTI.  Pt states having symptoms for three days.  Pt reports urgency, burning, and frequency.  Pt denies blood in urine.  Pt denies pain medication prior to arrival.

## 2024-06-27 NOTE — DISCHARGE INSTRUCTIONS
You presented to the ER today for concern for UTI.  You do have a UTI.  Take antibiotics as prescribed.  Take Pyridium to help with discomfort.  Please follow-up with your primary care physician or the [facility] whose information is provided.  If you develop new or worsening symptoms please return to the ER.

## 2024-06-27 NOTE — ED PROVIDER NOTES
Appearance CLOUDY (*)     Blood, Urine LARGE (*)     Leukocyte Esterase, Urine LARGE (*)     BACTERIA, URINE 3+ (*)     All other components within normal limits   URINE CULTURE HOLD SAMPLE   CULTURE, URINE       All other labs were within normal range or not returned as of this dictation.    EMERGENCY DEPARTMENT COURSE and DIFFERENTIAL DIAGNOSIS/MDM:   Vitals:    Vitals:    06/27/24 1756   BP: 111/76   Pulse: 73   Resp: 18   Temp: 98.5 °F (36.9 °C)   TempSrc: Oral   SpO2: 97%   Weight: 73.9 kg (163 lb)   Height: 1.676 m (5' 6\")           Medical Decision Making  Patient is a 57 yo female who presents with urinary symptoms concerning for UTI. She is afebrile, otherwise well appearing, no CVA tenderness. Low suspicion for pyelonephritis.  UA shows large blood, large leukocyte esterase, 3+ bacteria. will send for culture and plan to treat for UTI.  Advise follow-up to PCP and [facility] given recurrent UTIs.  Strict return to ER precautions were discussed in detail.    Amount and/or Complexity of Data Reviewed  Labs: ordered.    Risk  Prescription drug management.            REASSESSMENT            CONSULTS:  None    PROCEDURES:  Unless otherwise noted below, none     Procedures      FINAL IMPRESSION      1. Acute cystitis without hematuria          DISPOSITION/PLAN   DISPOSITION Decision To Discharge 06/27/2024 06:45:31 PM      PATIENT REFERRED TO:  [facility]  Address: [address] , [place] [address]  Phone: [phone]  Schedule an appointment as soon as possible for a visit       [facility] [place] [place]  [id number]  [facility] [address]  [phone]  Go to   If symptoms worsen      DISCHARGE MEDICATIONS:  Discharge Medication List as of 6/27/2024  6:46 PM        START taking these medications    Details   cephALEXin (KEFLEX) 500 MG capsule Take 1 capsule by mouth 4 times daily, Disp-28 capsule, R-0Normal      phenazopyridine (PHENAZO) 200 MG tablet Take 1 tablet by mouth 3

## 2024-06-29 LAB
BACTERIA SPEC CULT: ABNORMAL
CC UR VC: ABNORMAL
SERVICE CMNT-IMP: ABNORMAL

## 2024-08-09 ENCOUNTER — OFFICE VISIT (OUTPATIENT)
Age: 59
End: 2024-08-09

## 2024-08-09 VITALS
BODY MASS INDEX: 27.8 KG/M2 | RESPIRATION RATE: 16 BRPM | WEIGHT: 173 LBS | SYSTOLIC BLOOD PRESSURE: 129 MMHG | HEIGHT: 66 IN | OXYGEN SATURATION: 96 % | DIASTOLIC BLOOD PRESSURE: 68 MMHG | HEART RATE: 66 BPM

## 2024-08-09 DIAGNOSIS — Z01.419 GYNECOLOGIC EXAM NORMAL: Primary | ICD-10-CM

## 2024-08-09 DIAGNOSIS — R35.0 FREQUENT URINATION: ICD-10-CM

## 2024-08-09 DIAGNOSIS — Z12.4 ENCOUNTER FOR SCREENING FOR MALIGNANT NEOPLASM OF CERVIX: ICD-10-CM

## 2024-08-09 SDOH — ECONOMIC STABILITY: FOOD INSECURITY: WITHIN THE PAST 12 MONTHS, THE FOOD YOU BOUGHT JUST DIDN'T LAST AND YOU DIDN'T HAVE MONEY TO GET MORE.: NEVER TRUE

## 2024-08-09 SDOH — ECONOMIC STABILITY: INCOME INSECURITY: HOW HARD IS IT FOR YOU TO PAY FOR THE VERY BASICS LIKE FOOD, HOUSING, MEDICAL CARE, AND HEATING?: NOT HARD AT ALL

## 2024-08-09 SDOH — ECONOMIC STABILITY: FOOD INSECURITY: WITHIN THE PAST 12 MONTHS, YOU WORRIED THAT YOUR FOOD WOULD RUN OUT BEFORE YOU GOT MONEY TO BUY MORE.: NEVER TRUE

## 2024-08-09 ASSESSMENT — PATIENT HEALTH QUESTIONNAIRE - PHQ9
SUM OF ALL RESPONSES TO PHQ QUESTIONS 1-9: 0
2. FEELING DOWN, DEPRESSED OR HOPELESS: NOT AT ALL
SUM OF ALL RESPONSES TO PHQ QUESTIONS 1-9: 0
SUM OF ALL RESPONSES TO PHQ9 QUESTIONS 1 & 2: 0
1. LITTLE INTEREST OR PLEASURE IN DOING THINGS: NOT AT ALL
SUM OF ALL RESPONSES TO PHQ QUESTIONS 1-9: 0
SUM OF ALL RESPONSES TO PHQ QUESTIONS 1-9: 0

## 2024-08-09 NOTE — PROGRESS NOTES
1. \"Have you been to the ER, urgent care clinic since your last visit?  Hospitalized since your last visit?\" No    2. \"Have you seen or consulted any other health care providers outside of the [facility] since your last visit?\" No     3. For patients aged 45-75: Has the patient had a colonoscopy / FIT/ Cologuard? No      If the patient is female:    4. For patients aged 40-74: Has the patient had a mammogram within the past 2 years? Yes - no Care Gap present      5. For patients aged 21-65: Has the patient had a pap smear? Yes - no Care Gap present

## 2024-08-11 LAB — BACTERIA UR CULT: NO GROWTH

## 2024-08-13 LAB
., LABCORP: NORMAL
CYTOLOGIST CVX/VAG CYTO: NORMAL
CYTOLOGY CVX/VAG DOC CYTO: NORMAL
CYTOLOGY CVX/VAG DOC THIN PREP: NORMAL
DX ICD CODE: NORMAL
Lab: NORMAL
Lab: NORMAL
OTHER STN SPEC: NORMAL
STAT OF ADQ CVX/VAG CYTO-IMP: NORMAL

## 2024-09-18 ENCOUNTER — HOSPITAL ENCOUNTER (OUTPATIENT)
Facility: HOSPITAL | Age: 59
Discharge: HOME | End: 2024-09-21
Attending: OBSTETRICS & GYNECOLOGY
Payer: MEDICAID

## 2024-09-18 VITALS — HEIGHT: 66 IN | BODY MASS INDEX: 27.8 KG/M2 | WEIGHT: 173 LBS

## 2024-09-18 DIAGNOSIS — Z12.31 VISIT FOR SCREENING MAMMOGRAM: ICD-10-CM

## 2024-09-18 PROCEDURE — 77063 BREAST TOMOSYNTHESIS BI: CPT

## 2025-07-06 ENCOUNTER — HOSPITAL ENCOUNTER (EMERGENCY)
Facility: HOSPITAL | Age: 60
Discharge: HOME | End: 2025-07-06
Attending: STUDENT IN AN ORGANIZED HEALTH CARE EDUCATION/TRAINING PROGRAM

## 2025-07-06 ENCOUNTER — APPOINTMENT (OUTPATIENT)
Facility: HOSPITAL | Age: 60
End: 2025-07-06

## 2025-07-06 VITALS
HEIGHT: 66 IN | BODY MASS INDEX: 28.28 KG/M2 | WEIGHT: 176 LBS | DIASTOLIC BLOOD PRESSURE: 64 MMHG | HEART RATE: 85 BPM | OXYGEN SATURATION: 97 % | TEMPERATURE: 99.9 F | SYSTOLIC BLOOD PRESSURE: 109 MMHG | RESPIRATION RATE: 14 BRPM

## 2025-07-06 DIAGNOSIS — K57.32 SIGMOID DIVERTICULITIS: Primary | ICD-10-CM

## 2025-07-06 LAB
ALBUMIN SERPL-MCNC: 4 G/DL (ref 3.5–5.2)
ALBUMIN/GLOB SERPL: 1 (ref 1.1–2.2)
ALP SERPL-CCNC: 96 U/L (ref 35–104)
ALT SERPL-CCNC: <5 U/L (ref 10–35)
ANION GAP SERPL CALC-SCNC: 11 MMOL/L (ref 2–12)
APPEARANCE UR: CLEAR
AST SERPL-CCNC: 12 U/L (ref 10–35)
BACTERIA URNS QL MICRO: NEGATIVE /HPF
BASOPHILS # BLD: 0.04 K/UL (ref 0–0.1)
BASOPHILS NFR BLD: 0.4 % (ref 0–1)
BILIRUB SERPL-MCNC: 0.6 MG/DL (ref 0.2–1)
BILIRUB UR QL: NEGATIVE
BUN SERPL-MCNC: 10 MG/DL (ref 8–23)
BUN/CREAT SERPL: 19 (ref 12–20)
CALCIUM SERPL-MCNC: 8.9 MG/DL (ref 8.8–10.2)
CHLORIDE SERPL-SCNC: 103 MMOL/L (ref 98–107)
CO2 SERPL-SCNC: 25 MMOL/L (ref 22–29)
COLOR UR: ABNORMAL
CREAT SERPL-MCNC: 0.53 MG/DL (ref 0.5–0.9)
DIFFERENTIAL METHOD BLD: ABNORMAL
EOSINOPHIL # BLD: 0.06 K/UL (ref 0–0.4)
EOSINOPHIL NFR BLD: 0.6 % (ref 0–7)
EPITH CASTS URNS QL MICRO: ABNORMAL /LPF
ERYTHROCYTE [DISTWIDTH] IN BLOOD BY AUTOMATED COUNT: 13.1 % (ref 11.5–14.5)
GLOBULIN SER CALC-MCNC: 3.9 G/DL (ref 2–4)
GLUCOSE SERPL-MCNC: 111 MG/DL (ref 65–100)
GLUCOSE UR STRIP.AUTO-MCNC: NEGATIVE MG/DL
HCT VFR BLD AUTO: 34.8 % (ref 35–47)
HGB BLD-MCNC: 11.9 G/DL (ref 11.5–16)
HGB UR QL STRIP: ABNORMAL
IMM GRANULOCYTES # BLD AUTO: 0.03 K/UL (ref 0–0.04)
IMM GRANULOCYTES NFR BLD AUTO: 0.3 % (ref 0–0.5)
KETONES UR QL STRIP.AUTO: ABNORMAL MG/DL
LEUKOCYTE ESTERASE UR QL STRIP.AUTO: ABNORMAL
LYMPHOCYTES # BLD: 2.04 K/UL (ref 0.8–3.5)
LYMPHOCYTES NFR BLD: 18.9 % (ref 12–49)
MCH RBC QN AUTO: 30.7 PG (ref 26–34)
MCHC RBC AUTO-ENTMCNC: 34.2 G/DL (ref 30–36.5)
MCV RBC AUTO: 89.9 FL (ref 80–99)
MONOCYTES # BLD: 0.98 K/UL (ref 0–1)
MONOCYTES NFR BLD: 9.1 % (ref 5–13)
NEUTS SEG # BLD: 7.66 K/UL (ref 1.8–8)
NEUTS SEG NFR BLD: 70.7 % (ref 32–75)
NITRITE UR QL STRIP.AUTO: NEGATIVE
NRBC # BLD: 0 K/UL (ref 0–0.01)
NRBC BLD-RTO: 0 PER 100 WBC
PH UR STRIP: 7 (ref 5–8)
PLATELET # BLD AUTO: 233 K/UL (ref 150–400)
PMV BLD AUTO: 10.2 FL (ref 8.9–12.9)
POTASSIUM SERPL-SCNC: 3.5 MMOL/L (ref 3.5–5.1)
PROT SERPL-MCNC: 7.9 G/DL (ref 6.4–8.3)
PROT UR STRIP-MCNC: ABNORMAL MG/DL
RBC # BLD AUTO: 3.87 M/UL (ref 3.8–5.2)
RBC #/AREA URNS HPF: ABNORMAL /HPF
SODIUM SERPL-SCNC: 139 MMOL/L (ref 136–145)
SP GR UR REFRACTOMETRY: 1.01 (ref 1–1.03)
SPECIMEN HOLD: NORMAL
UROBILINOGEN UR QL STRIP.AUTO: 1 EU/DL (ref 0.2–1)
WBC # BLD AUTO: 10.8 K/UL (ref 3.6–11)
WBC URNS QL MICRO: ABNORMAL /HPF (ref 0–4)

## 2025-07-06 PROCEDURE — 85025 COMPLETE CBC W/AUTO DIFF WBC: CPT

## 2025-07-06 PROCEDURE — 96374 THER/PROPH/DIAG INJ IV PUSH: CPT

## 2025-07-06 PROCEDURE — 81001 URINALYSIS AUTO W/SCOPE: CPT

## 2025-07-06 PROCEDURE — 36415 COLL VENOUS BLD VENIPUNCTURE: CPT

## 2025-07-06 PROCEDURE — 6360000004 HC RX CONTRAST MEDICATION

## 2025-07-06 PROCEDURE — 80053 COMPREHEN METABOLIC PANEL: CPT

## 2025-07-06 PROCEDURE — 96375 TX/PRO/DX INJ NEW DRUG ADDON: CPT

## 2025-07-06 PROCEDURE — 74177 CT ABD & PELVIS W/CONTRAST: CPT

## 2025-07-06 PROCEDURE — 6360000002 HC RX W HCPCS

## 2025-07-06 PROCEDURE — 99285 EMERGENCY DEPT VISIT HI MDM: CPT

## 2025-07-06 RX ORDER — IOPAMIDOL 755 MG/ML
100 INJECTION, SOLUTION INTRAVASCULAR
Status: COMPLETED | OUTPATIENT
Start: 2025-07-06 | End: 2025-07-06

## 2025-07-06 RX ORDER — ONDANSETRON 2 MG/ML
4 INJECTION INTRAMUSCULAR; INTRAVENOUS
Status: COMPLETED | OUTPATIENT
Start: 2025-07-06 | End: 2025-07-06

## 2025-07-06 RX ORDER — KETOROLAC TROMETHAMINE 30 MG/ML
15 INJECTION, SOLUTION INTRAMUSCULAR; INTRAVENOUS
Status: COMPLETED | OUTPATIENT
Start: 2025-07-06 | End: 2025-07-06

## 2025-07-06 RX ADMIN — KETOROLAC TROMETHAMINE 15 MG: 30 INJECTION, SOLUTION INTRAMUSCULAR at 13:54

## 2025-07-06 RX ADMIN — ONDANSETRON 4 MG: 2 INJECTION, SOLUTION INTRAMUSCULAR; INTRAVENOUS at 13:54

## 2025-07-06 RX ADMIN — IOPAMIDOL 100 ML: 755 INJECTION, SOLUTION INTRAVENOUS at 13:37

## 2025-07-06 ASSESSMENT — PAIN SCALES - GENERAL
PAINLEVEL_OUTOF10: 2
PAINLEVEL_OUTOF10: 5
PAINLEVEL_OUTOF10: 3

## 2025-07-06 ASSESSMENT — PAIN - FUNCTIONAL ASSESSMENT
PAIN_FUNCTIONAL_ASSESSMENT: PREVENTS OR INTERFERES SOME ACTIVE ACTIVITIES AND ADLS
PAIN_FUNCTIONAL_ASSESSMENT: 0-10
PAIN_FUNCTIONAL_ASSESSMENT: 0-10

## 2025-07-06 ASSESSMENT — PAIN DESCRIPTION - LOCATION: LOCATION: ABDOMEN

## 2025-07-06 ASSESSMENT — PAIN DESCRIPTION - ORIENTATION: ORIENTATION: LOWER

## 2025-07-06 ASSESSMENT — PAIN DESCRIPTION - PAIN TYPE: TYPE: ACUTE PAIN

## 2025-07-06 ASSESSMENT — LIFESTYLE VARIABLES
HOW OFTEN DO YOU HAVE A DRINK CONTAINING ALCOHOL: PATIENT DECLINED
HOW MANY STANDARD DRINKS CONTAINING ALCOHOL DO YOU HAVE ON A TYPICAL DAY: PATIENT DECLINED

## 2025-07-06 ASSESSMENT — PAIN DESCRIPTION - DESCRIPTORS: DESCRIPTORS: ACHING

## 2025-07-06 NOTE — ED NOTES
Patient reports symptom improvement or at least no worsening of symptoms since arrival to ED.  Pain reassessment 2/10.  VSS at time of discharge.  I have reviewed discharge instructions with the patient, who verbalized understanding. Patient stable and discharged from ED via AMBULATORY  and with family  If applicable, PIV removed yes

## 2025-07-06 NOTE — ED TRIAGE NOTES
Patient arrives c/o dysuria and lower abdominal pain radiating to back. Denies malodorous urine. Reports recently going to beach and holding her urine for longer than usual. Endorsing subjective fever. Denies concern for STIs, vaginal bleeding or discharge.

## 2025-07-06 NOTE — ED PROVIDER NOTES
[place] EMERGENCY DEPARTMENT  EMERGENCY DEPARTMENT ENCOUNTER      Pt Name: Maribel Nichols  MRN: [id number]  Birthdate 1965  Date of evaluation: 2025  Provider: Rachael Sahu PA-C    CHIEF COMPLAINT       Chief Complaint   Patient presents with    Dysuria    Abdominal Pain         HISTORY OF PRESENT ILLNESS   (Location/Symptom, Timing/Onset, Context/Setting, Quality, Duration, Modifying Factors, Severity)  Note limiting factors.   59-year-old female no reported past medical history presenting with concerns of lower abdominal pain for the past 2 days.  Patient states that her pain is worse on the left lower side and is also exacerbated with urination.  She also reports feeling hot and feverish but has not taken her temperature at home.  She is also endorsing nausea.  Denies any associated diarrhea, blood in stool, blood in urine, chest pain, shortness of breath.            Review of External Medical Records:     Nursing Notes were reviewed.    REVIEW OF SYSTEMS    (2-9 systems for level 4, 10 or more for level 5)     Review of Systems    Except as noted above the remainder of the review of systems was reviewed and negative.       PAST MEDICAL HISTORY     Past Medical History:   Diagnosis Date    Fibroid, uterine     History of seasonal allergies          SURGICAL HISTORY       Past Surgical History:   Procedure Laterality Date    GYN               CURRENT MEDICATIONS       Discharge Medication List as of 2025  2:05 PM        CONTINUE these medications which have NOT CHANGED    Details   rosuvastatin (CRESTOR) 5 MG tablet Take 1 tablet by mouth daily, Disp-90 tablet, R-0Normal      naproxen (NAPROSYN) 500 MG tablet Take 1 tablet by mouth 2 times daily as needed for Pain, Disp-30 tablet, R-0Normal      pantoprazole (PROTONIX) 20 MG tablet Take 1 tablet by mouth daily, Disp-30 tablet, R-3Normal      levocetirizine (XYZAL) 5 MG tablet TAKE ONE TABLET BY MOUTH EVERY NIGHT AT BEDTIME AS NEEDED

## 2025-07-06 NOTE — DISCHARGE INSTRUCTIONS
Today your lab work and imaging revealed a diagnosis of diverticulitis which is inflammation of your colon.  Please take the prescribed antibiotic until it is gone, even if you are feeling better.  Please also follow-up with your primary care team within 1 week to discuss today's visit.  If your symptoms change or worsen please dont hesitate to return to the ED.

## 2025-07-31 ENCOUNTER — TELEPHONE (OUTPATIENT)
Facility: CLINIC | Age: 60
End: 2025-07-31